# Patient Record
Sex: FEMALE | Race: WHITE | NOT HISPANIC OR LATINO | Employment: FULL TIME | URBAN - METROPOLITAN AREA
[De-identification: names, ages, dates, MRNs, and addresses within clinical notes are randomized per-mention and may not be internally consistent; named-entity substitution may affect disease eponyms.]

---

## 2021-05-25 ENCOUNTER — RECORDS - HEALTHEAST (OUTPATIENT)
Dept: ADMINISTRATIVE | Facility: CLINIC | Age: 46
End: 2021-05-25

## 2021-05-28 ENCOUNTER — RECORDS - HEALTHEAST (OUTPATIENT)
Dept: ADMINISTRATIVE | Facility: CLINIC | Age: 46
End: 2021-05-28

## 2021-05-29 ENCOUNTER — RECORDS - HEALTHEAST (OUTPATIENT)
Dept: ADMINISTRATIVE | Facility: CLINIC | Age: 46
End: 2021-05-29

## 2022-12-19 ENCOUNTER — OFFICE VISIT (OUTPATIENT)
Dept: FAMILY MEDICINE | Facility: CLINIC | Age: 47
End: 2022-12-19
Payer: COMMERCIAL

## 2022-12-19 VITALS
OXYGEN SATURATION: 98 % | HEIGHT: 64 IN | HEART RATE: 70 BPM | BODY MASS INDEX: 31.58 KG/M2 | TEMPERATURE: 99.1 F | SYSTOLIC BLOOD PRESSURE: 156 MMHG | DIASTOLIC BLOOD PRESSURE: 96 MMHG | WEIGHT: 185 LBS

## 2022-12-19 DIAGNOSIS — Z12.4 CERVICAL CANCER SCREENING: ICD-10-CM

## 2022-12-19 DIAGNOSIS — Z12.11 SCREEN FOR COLON CANCER: ICD-10-CM

## 2022-12-19 DIAGNOSIS — H93.13 TINNITUS, BILATERAL: ICD-10-CM

## 2022-12-19 DIAGNOSIS — Z12.31 VISIT FOR SCREENING MAMMOGRAM: ICD-10-CM

## 2022-12-19 DIAGNOSIS — Z00.00 ENCOUNTER FOR PREVENTIVE CARE: Primary | ICD-10-CM

## 2022-12-19 DIAGNOSIS — J45.990 EXERCISE INDUCED BRONCHOSPASM: ICD-10-CM

## 2022-12-19 PROBLEM — R87.610 ASCUS OF CERVIX WITH NEGATIVE HIGH RISK HPV: Status: ACTIVE | Noted: 2020-01-20

## 2022-12-19 PROCEDURE — 90471 IMMUNIZATION ADMIN: CPT | Performed by: FAMILY MEDICINE

## 2022-12-19 PROCEDURE — G0145 SCR C/V CYTO,THINLAYER,RESCR: HCPCS | Performed by: FAMILY MEDICINE

## 2022-12-19 PROCEDURE — 87624 HPV HI-RISK TYP POOLED RSLT: CPT | Performed by: FAMILY MEDICINE

## 2022-12-19 PROCEDURE — 90715 TDAP VACCINE 7 YRS/> IM: CPT | Performed by: FAMILY MEDICINE

## 2022-12-19 PROCEDURE — 99386 PREV VISIT NEW AGE 40-64: CPT | Mod: 25 | Performed by: FAMILY MEDICINE

## 2022-12-19 RX ORDER — ALBUTEROL SULFATE 90 UG/1
1 AEROSOL, METERED RESPIRATORY (INHALATION) PRN
Qty: 18 G | Refills: 1 | Status: SHIPPED | OUTPATIENT
Start: 2022-12-19 | End: 2023-03-29

## 2022-12-19 RX ORDER — ALBUTEROL SULFATE 90 UG/1
1 AEROSOL, METERED RESPIRATORY (INHALATION) PRN
COMMUNITY
End: 2022-12-19

## 2022-12-19 ASSESSMENT — ENCOUNTER SYMPTOMS
DYSURIA: 0
CHILLS: 0
FREQUENCY: 0
HEMATURIA: 0
HEADACHES: 0
JOINT SWELLING: 0
COUGH: 0
SHORTNESS OF BREATH: 0
NAUSEA: 0
HEARTBURN: 0
WEAKNESS: 0
BREAST MASS: 0
PARESTHESIAS: 0
ABDOMINAL PAIN: 0
CONSTIPATION: 0
SORE THROAT: 0
NERVOUS/ANXIOUS: 0
DIARRHEA: 0
FEVER: 0
PALPITATIONS: 0
HEMATOCHEZIA: 0
MYALGIAS: 0
EYE PAIN: 0
DIZZINESS: 0
ARTHRALGIAS: 0

## 2022-12-19 ASSESSMENT — PAIN SCALES - GENERAL: PAINLEVEL: NO PAIN (0)

## 2022-12-19 NOTE — PROGRESS NOTES
SUBJECTIVE:   CC: Carey is an 47 year old who presents for preventive health visit.     Patient has been advised of split billing requirements and indicates understanding: Yes     Healthy Habits:     Getting at least 3 servings of Calcium per day:  Yes    Bi-annual eye exam:  Yes    Dental care twice a year:  Yes    Sleep apnea or symptoms of sleep apnea:  None    Diet:  Regular (no restrictions)    Frequency of exercise:  None    Taking medications regularly:  Yes    Medication side effects:  Not applicable    PHQ-2 Total Score: 0    Additional concerns today:  Yes    Breakthrough Bleeding  -Occurs intermittent, there when wiping and occurs with valsalva maneuver   -Denies any vaginal pain, however endorsing increased cramping  -Noticed for 9 months. Developed heaviness/cramping last several months.   -Patient still having regular periods.   -No pain with intercourse. Patient does have on and off bleeding with intercourse.   -Last period on December 5th  -No easy bleeding or bruising  -Denies history of abnormal pap smears. May have had an imperforate hymen, no abnormal pap smears.   -No history of uterine or ovarian cancer. No blood in stool or urine.     Today's PHQ-2 Score:   PHQ-2 ( 1999 [employer]) 12/19/2022   Q1: Little interest or pleasure in doing things 0   Q2: Feeling down, depressed or hopeless 0   PHQ-2 Score 0   Q1: Little interest or pleasure in doing things Not at all   Q2: Feeling down, depressed or hopeless Not at all   PHQ-2 Score 0       Social History     Tobacco Use     Smoking status: Never     Smokeless tobacco: Never   Substance Use Topics     Alcohol use: Yes     Alcohol/week: 7.0 standard drinks     Types: 7 Standard drinks or equivalent per week     If you drink alcohol do you typically have >3 drinks per day or >7 drinks per week? No    Alcohol Use 12/19/2022   Prescreen: >3 drinks/day or >7 drinks/week? No   Prescreen: >3 drinks/day or >7 drinks/week? -     Reviewed orders with patient.  " Reviewed health maintenance and updated orders accordingly - Yes  Lab work is in process  Labs reviewed in EPIC    Breast Cancer Screening:    Breast CA Risk Assessment (FHS-7) 12/19/2022   Do you have a family history of breast, colon, or ovarian cancer? No / Unknown       Mammogram Screening: Recommended annual mammography  Pertinent mammograms are reviewed under the imaging tab.    History of abnormal Pap smear: NO - age 30-65 PAP every 5 years with negative HPV co-testing recommended     Reviewed and updated as needed this visit by clinical staff   Tobacco  Allergies  Meds  Problems  Med Hx  Surg Hx  Fam Hx          Reviewed and updated as needed this visit by Provider   Tobacco  Allergies  Meds  Problems  Med Hx  Surg Hx  Fam Hx             Review of Systems   Constitutional: Negative for chills and fever.   HENT: Negative for congestion, ear pain, hearing loss and sore throat.    Eyes: Negative for pain and visual disturbance.   Respiratory: Negative for cough and shortness of breath.    Cardiovascular: Negative for chest pain, palpitations and peripheral edema.   Gastrointestinal: Negative for abdominal pain, constipation, diarrhea, heartburn, hematochezia and nausea.   Breasts:  Negative for tenderness, breast mass and discharge.   Genitourinary: Positive for pelvic pain and vaginal bleeding. Negative for dysuria, frequency, genital sores, hematuria, urgency and vaginal discharge.   Musculoskeletal: Negative for arthralgias, joint swelling and myalgias.   Skin: Negative for rash.   Neurological: Negative for dizziness, weakness, headaches and paresthesias.   Psychiatric/Behavioral: Negative for mood changes. The patient is not nervous/anxious.       OBJECTIVE:   BP (!) 156/96 (BP Location: Right arm, Patient Position: Right side, Cuff Size: Adult Regular)   Pulse 70   Temp 99.1  F (37.3  C) (Temporal)   Ht 1.626 m (5' 4\")   Wt 83.9 kg (185 lb)   LMP 12/05/2022   SpO2 98%   Breastfeeding " No   BMI 31.76 kg/m    Physical Exam  GENERAL: healthy, alert and no distress  EYES: Eyes grossly normal to inspection, PERRL and conjunctivae and sclerae normal  HENT: ear canals and TM's normal, nose and mouth without ulcers or lesions  NECK: no adenopathy, no asymmetry, masses, or scars and thyroid normal to palpation  RESP: lungs clear to auscultation - no rales, rhonchi or wheezes  CV: regular rate and rhythm, normal S1 S2, no S3 or S4, no murmur, click or rub, no peripheral edema and peripheral pulses strong  ABDOMEN: soft, nontender, no hepatosplenomegaly, no masses and bowel sounds normal  MS: no gross musculoskeletal defects noted, no edema  SKIN: no suspicious lesions or rashes  PSYCH: mentation appears normal, affect normal/bright  : Normal vaginal wall, possible polyp and small friable lesion.     Diagnostic Test Results:  Labs reviewed in Epic  Results for orders placed or performed in visit on 12/19/22   Pap Screen with HPV - recommended age 30 - 65 years     Status: None   Result Value Ref Range    Interpretation        Negative for Intraepithelial Lesion or Malignancy (NILM)    Comment         Papanicolaou Test Limitations:  Cervical cytology is a screening test with limited sensitivity, and regular screening is critical for cancer prevention.  Pap tests are primarily effective for the diagnosis/prevention of squamous cell carcinoma, not adenocarcinoma or other cancers.        Specimen Adequacy       Satisfactory for evaluation, endocervical/transformation zone component present    Clinical Information       irregular bleeding      Reflex Testing Yes regardless of result     Previous Abnormal?       Yes[ASCUS positive HPV negative      Previous Abnormal Diagnosis       No colp      Performing Labs       The technical component of this testing was completed at [facility]         ASSESSMENT/PLAN:   Carey was seen today for  physical.    Diagnoses and all orders for this visit:    Encounter for preventive care  Patient with healthy habits, discussed BP as elevated, recommending 30 day follow up.     Visit for screening mammogram  -     MA SCREENING DIGITAL BILAT - Future  (s+30); Future    Screen for colon cancer  -     COLOGUARD(EXACT SCIENCES); Future    Cervical cancer screening  Hx of ASCUS  -     Pap Screen with HPV - recommended age 30 - 65 years  -     HPV Hold (Lab Only)  -     HPV High Risk Types DNA Cervical    Exercise induced bronchospasm  Chronic, stable.   -     albuterol (PROAIR HFA/PROVENTIL HFA/VENTOLIN HFA) 108 (90 Base) MCG/ACT inhaler; Inhale 1 puff into the lungs as needed for shortness of breath, wheezing or cough    Tinnitus, bilateral  Bilateral tinnitus with some difficulty hearing. New but chronic. Will refer for evaluation.   -     Adult Audiology  Referral; Future    Other orders  -     REVIEW OF HEALTH MAINTENANCE PROTOCOL ORDERS  -     TDAP VACCINE (Adacel, Boostrix)        Patient has been advised of split billing requirements and indicates understanding: Yes      COUNSELING:  Reviewed preventive health counseling, as reflected in patient instructions        She reports that she has never smoked. She has never used smokeless tobacco.      Catarina Serrano, [facility]

## 2022-12-21 LAB
BKR LAB AP GYN ADEQUACY: NORMAL
BKR LAB AP GYN INTERPRETATION: NORMAL
BKR LAB AP HPV REFLEX: NORMAL
BKR LAB AP PREVIOUS ABNL DX: NORMAL
BKR LAB AP PREVIOUS ABNORMAL: NORMAL
PATH REPORT.COMMENTS IMP SPEC: NORMAL
PATH REPORT.COMMENTS IMP SPEC: NORMAL
PATH REPORT.RELEVANT HX SPEC: NORMAL

## 2022-12-23 ENCOUNTER — TELEPHONE (OUTPATIENT)
Dept: FAMILY MEDICINE | Facility: CLINIC | Age: 47
End: 2022-12-23

## 2022-12-23 DIAGNOSIS — J45.990 EXERCISE INDUCED BRONCHOSPASM: Primary | ICD-10-CM

## 2022-12-23 LAB
HUMAN PAPILLOMA VIRUS 16 DNA: NEGATIVE
HUMAN PAPILLOMA VIRUS 18 DNA: POSITIVE
HUMAN PAPILLOMA VIRUS FINAL DIAGNOSIS: ABNORMAL
HUMAN PAPILLOMA VIRUS OTHER HR: NEGATIVE

## 2022-12-23 RX ORDER — ALBUTEROL SULFATE 90 UG/1
2 AEROSOL, METERED RESPIRATORY (INHALATION) EVERY 6 HOURS PRN
Qty: 18 G | Refills: 1 | Status: SHIPPED | OUTPATIENT
Start: 2022-12-23 | End: 2023-02-22

## 2022-12-23 NOTE — TELEPHONE ENCOUNTER
"Received fax from pharmacy requesting clarification for Albuterol.    \"Please clarify the FREQUENCY of administration for the prescription ALBUTEROL HFA (every day, BID, etc) Please include updated quantity for 90 days supply, if necessary. Thank you\"  "

## 2022-12-26 ENCOUNTER — PATIENT OUTREACH (OUTPATIENT)
Dept: FAMILY MEDICINE | Facility: CLINIC | Age: 47
End: 2022-12-26

## 2023-01-16 ENCOUNTER — ANCILLARY PROCEDURE (OUTPATIENT)
Dept: MAMMOGRAPHY | Facility: HOSPITAL | Age: 48
End: 2023-01-16
Attending: FAMILY MEDICINE
Payer: COMMERCIAL

## 2023-01-16 DIAGNOSIS — Z12.31 VISIT FOR SCREENING MAMMOGRAM: ICD-10-CM

## 2023-01-16 PROCEDURE — 77067 SCR MAMMO BI INCL CAD: CPT

## 2023-01-19 LAB — NONINV COLON CA DNA+OCC BLD SCRN STL QL: NEGATIVE

## 2023-01-25 ENCOUNTER — ALLIED HEALTH/NURSE VISIT (OUTPATIENT)
Dept: FAMILY MEDICINE | Facility: CLINIC | Age: 48
End: 2023-01-25
Payer: COMMERCIAL

## 2023-01-25 VITALS — DIASTOLIC BLOOD PRESSURE: 96 MMHG | SYSTOLIC BLOOD PRESSURE: 150 MMHG

## 2023-01-25 DIAGNOSIS — Z01.30 BP CHECK: Primary | ICD-10-CM

## 2023-01-25 PROCEDURE — 99207 PR NO CHARGE NURSE ONLY: CPT

## 2023-01-25 NOTE — PROGRESS NOTES
I met with Carey Dawson at the request of  to recheck her blood pressure.  Blood pressure medications on the med list were reviewed with patient.    Patient has taken all medications as per usual regimen: Yes  Patient reports tolerating them without any issues or concerns: Yes    Vitals:    01/25/23 1522 01/25/23 1527   BP: (!) 173/91 (!) 150/96   BP Location: Right arm Right arm   Patient Position: Sitting Sitting   Cuff Size: Adult Regular Adult Regular       After 5 minutes, the patient's blood pressure remained greater than or equal to 140/90.    Is the patient currently having any chest pain? No  Does the patient currently have a headache? No  Does the patient currently have any vision changes? No  Does the patient currently have any nausea? No  Does the patient currently have any abdominal pain? No    The previous encounter was reviewed.  The patient was discharged and the note will be sent to the provider for final review.

## 2023-02-04 ENCOUNTER — HEALTH MAINTENANCE LETTER (OUTPATIENT)
Age: 48
End: 2023-02-04

## 2023-02-16 ENCOUNTER — TELEPHONE (OUTPATIENT)
Dept: INTERNAL MEDICINE | Facility: CLINIC | Age: 48
End: 2023-02-16
Payer: COMMERCIAL

## 2023-02-16 DIAGNOSIS — Z01.812 PRE-PROCEDURAL LABORATORY EXAMINATION: Primary | ICD-10-CM

## 2023-02-21 DIAGNOSIS — J45.990 EXERCISE INDUCED BRONCHOSPASM: ICD-10-CM

## 2023-02-22 RX ORDER — ALBUTEROL SULFATE 90 UG/1
2 AEROSOL, METERED RESPIRATORY (INHALATION) EVERY 6 HOURS PRN
Qty: 18 G | Refills: 1 | Status: SHIPPED | OUTPATIENT
Start: 2023-02-22

## 2023-02-22 NOTE — TELEPHONE ENCOUNTER
"Last Written Prescription Date:  12/23/22  Last Fill Quantity: 18,  # refills: 1   Last office visit provider:  12/19/22     Requested Prescriptions   Pending Prescriptions Disp Refills     albuterol (PROAIR HFA/PROVENTIL HFA/VENTOLIN HFA) 108 (90 Base) MCG/ACT inhaler 18 g 1     Sig: Inhale 2 puffs into the lungs every 6 hours as needed for shortness of breath, wheezing or cough       Asthma Maintenance Inhalers - Anticholinergics Passed - 2/21/2023  3:04 PM        Passed - Patient is age 12 years or older        Passed - Recent (12 mo) or future (30 days) visit within the authorizing provider's specialty     Patient has had an office visit with the authorizing provider or a provider within the authorizing providers department within the previous 12 mos or has a future within next 30 days. See \"Patient Info\" tab in inbasket, or \"Choose Columns\" in Meds & Orders section of the refill encounter.              Passed - Medication is active on med list       Short-Acting Beta Agonist Inhalers Protocol  Passed - 2/21/2023  3:04 PM        Passed - Patient is age 12 or older        Passed - Recent (12 mo) or future (30 days) visit within the authorizing provider's specialty     Patient has had an office visit with the authorizing provider or a provider within the authorizing providers department within the previous 12 mos or has a future within next 30 days. See \"Patient Info\" tab in inbasket, or \"Choose Columns\" in Meds & Orders section of the refill encounter.              Passed - Medication is active on med list             Salome Roldan RN 02/22/23 4:55 PM  "

## 2023-03-29 ENCOUNTER — OFFICE VISIT (OUTPATIENT)
Dept: INTERNAL MEDICINE | Facility: CLINIC | Age: 48
End: 2023-03-29
Payer: COMMERCIAL

## 2023-03-29 VITALS
WEIGHT: 189.8 LBS | TEMPERATURE: 98.8 F | RESPIRATION RATE: 12 BRPM | DIASTOLIC BLOOD PRESSURE: 86 MMHG | HEIGHT: 64 IN | BODY MASS INDEX: 32.4 KG/M2 | HEART RATE: 68 BPM | OXYGEN SATURATION: 98 % | SYSTOLIC BLOOD PRESSURE: 142 MMHG

## 2023-03-29 DIAGNOSIS — Z01.812 PRE-PROCEDURAL LABORATORY EXAMINATION: ICD-10-CM

## 2023-03-29 DIAGNOSIS — R87.810 CERVICAL HIGH RISK HPV (HUMAN PAPILLOMAVIRUS) TEST POSITIVE: Primary | ICD-10-CM

## 2023-03-29 LAB — HCG UR QL: NEGATIVE

## 2023-03-29 PROCEDURE — 57452 EXAM OF CERVIX W/SCOPE: CPT | Performed by: NURSE PRACTITIONER

## 2023-03-29 PROCEDURE — 81025 URINE PREGNANCY TEST: CPT | Performed by: NURSE PRACTITIONER

## 2023-03-29 ASSESSMENT — PAIN SCALES - GENERAL: PAINLEVEL: NO PAIN (0)

## 2023-03-29 NOTE — PROGRESS NOTES
"Colposcopy Procedure Note    Indications: Recent pap smear showed NILM, + HR HPV type 18.  Pertinent past history includes:  2008, 2010, 2012, 2016 NIL pap  1/20/20 ASCUS pap, Neg HPV    No history of nicotine use. She had a change in sexual partners around 10 years ago after divorce from her first  of 17 years but she is in a monogamous relationship.     Procedure Details   BP (!) 142/86 (BP Location: Left arm, Patient Position: Sitting, Cuff Size: Adult Regular)   Pulse 68   Temp 98.8  F (37.1  C) (Oral)   Resp 12   Ht 1.626 m (5' 4\")   Wt 86.1 kg (189 lb 12.8 oz)   LMP 03/19/2023 (Exact Date)   SpO2 98%   BMI 32.58 kg/m      Body mass index is 32.58 kg/m .    The reason for procedure is explained, and verbal informed consent is obtained.    Speculum is placed in the vagina and visualization of cervix is achieved.   Visualization of the cervix: fully visible  Visualization of the squamocolumnar junction: fully visible    The cervix is swabbed with 3% acetic acid solution.   There are no acetowhite changes after application of dilute acetic acid. Green light filter is applied, no abnormal vessels seen.     Impression: Normal/benign    Plan:  The role of HPV in causing cervical pap smear abnormalities, dysplasia, and cancer is reviewed with the patient. She is advised to follow up with her PCP in 1 year for repeat co-testing.   "

## 2023-03-29 NOTE — PROGRESS NOTES
"  {PROVIDER CHARTING PREFERENCE:172048}    Subjective   Carey is a 48 year old, presenting for the following health issues:  Colposcopy    Additional Questions 3/29/2023   Roomed by Cat CH MA   Accompanied by N/A     HPI     {SUPERLIST (Optional):570813}  {additonal problems for provider to add (Optional):382334}      Review of Systems   {ROS COMP (Optional):250819}      Objective    BP (!) 142/86 (BP Location: Left arm, Patient Position: Sitting, Cuff Size: Adult Regular)   Pulse 68   Temp 98.8  F (37.1  C) (Oral)   Resp 12   Ht 1.626 m (5' 4\")   Wt 86.1 kg (189 lb 12.8 oz)   LMP 03/19/2023 (Exact Date)   SpO2 98%   BMI 32.58 kg/m    Body mass index is 32.58 kg/m .  Physical Exam   {Exam List (Optional):142407}    {Diagnostic Test Results (Optional):053989}    {AMBULATORY ATTESTATION (Optional):323950}            "

## 2023-04-24 ENCOUNTER — PATIENT OUTREACH (OUTPATIENT)
Dept: FAMILY MEDICINE | Facility: CLINIC | Age: 48
End: 2023-04-24
Payer: COMMERCIAL

## 2023-07-06 ENCOUNTER — OFFICE VISIT (OUTPATIENT)
Dept: FAMILY MEDICINE | Facility: CLINIC | Age: 48
End: 2023-07-06
Payer: COMMERCIAL

## 2023-07-06 ENCOUNTER — HOSPITAL ENCOUNTER (EMERGENCY)
Facility: HOSPITAL | Age: 48
Discharge: HOME | End: 2023-07-06
Attending: STUDENT IN AN ORGANIZED HEALTH CARE EDUCATION/TRAINING PROGRAM | Admitting: STUDENT IN AN ORGANIZED HEALTH CARE EDUCATION/TRAINING PROGRAM
Payer: COMMERCIAL

## 2023-07-06 VITALS
HEART RATE: 70 BPM | RESPIRATION RATE: 16 BRPM | TEMPERATURE: 98.7 F | SYSTOLIC BLOOD PRESSURE: 178 MMHG | BODY MASS INDEX: 31.03 KG/M2 | OXYGEN SATURATION: 99 % | DIASTOLIC BLOOD PRESSURE: 88 MMHG | WEIGHT: 180.78 LBS

## 2023-07-06 VITALS
TEMPERATURE: 98.6 F | OXYGEN SATURATION: 100 % | DIASTOLIC BLOOD PRESSURE: 95 MMHG | HEART RATE: 63 BPM | RESPIRATION RATE: 14 BRPM | WEIGHT: 182 LBS | SYSTOLIC BLOOD PRESSURE: 155 MMHG | BODY MASS INDEX: 31.24 KG/M2

## 2023-07-06 DIAGNOSIS — R51.9 TEMPORAL HEADACHE: ICD-10-CM

## 2023-07-06 DIAGNOSIS — R42 DIZZINESS: ICD-10-CM

## 2023-07-06 DIAGNOSIS — H53.2 DOUBLE VISION: ICD-10-CM

## 2023-07-06 DIAGNOSIS — R42 LIGHTHEADEDNESS: Primary | ICD-10-CM

## 2023-07-06 LAB
ALBUMIN SERPL BCG-MCNC: 4.5 G/DL (ref 3.5–5.2)
ALP SERPL-CCNC: 84 U/L (ref 35–104)
ALT SERPL W P-5'-P-CCNC: 32 U/L (ref 0–50)
ANION GAP SERPL CALCULATED.3IONS-SCNC: 13 MMOL/L (ref 7–15)
AST SERPL W P-5'-P-CCNC: 27 U/L (ref 0–45)
BASOPHILS # BLD AUTO: 0 10E3/UL (ref 0–0.2)
BASOPHILS # BLD AUTO: 0.1 10E3/UL (ref 0–0.2)
BASOPHILS NFR BLD AUTO: 1 %
BASOPHILS NFR BLD AUTO: 1 %
BILIRUB SERPL-MCNC: 0.2 MG/DL
BUN SERPL-MCNC: 9.7 MG/DL (ref 6–20)
CALCIUM SERPL-MCNC: 9.8 MG/DL (ref 8.6–10)
CHLORIDE SERPL-SCNC: 105 MMOL/L (ref 98–107)
CREAT SERPL-MCNC: 0.8 MG/DL (ref 0.51–0.95)
CRP SERPL-MCNC: 3.1 MG/L
DEPRECATED HCO3 PLAS-SCNC: 22 MMOL/L (ref 22–29)
EOSINOPHIL # BLD AUTO: 0.2 10E3/UL (ref 0–0.7)
EOSINOPHIL # BLD AUTO: 0.2 10E3/UL (ref 0–0.7)
EOSINOPHIL NFR BLD AUTO: 2 %
EOSINOPHIL NFR BLD AUTO: 3 %
ERYTHROCYTE [DISTWIDTH] IN BLOOD BY AUTOMATED COUNT: 13.9 % (ref 10–15)
ERYTHROCYTE [DISTWIDTH] IN BLOOD BY AUTOMATED COUNT: 14.2 % (ref 10–15)
ERYTHROCYTE [SEDIMENTATION RATE] IN BLOOD BY WESTERGREN METHOD: 10 MM/HR (ref 0–30)
GFR SERPL CREATININE-BSD FRML MDRD: 89 ML/MIN/1.73M2
GLUCOSE BLD-MCNC: 90 MG/DL (ref 60–99)
GLUCOSE SERPL-MCNC: 95 MG/DL (ref 70–99)
HCT VFR BLD AUTO: 39.4 % (ref 35–47)
HCT VFR BLD AUTO: 43.3 % (ref 35–47)
HGB BLD-MCNC: 13.1 G/DL (ref 11.7–15.7)
HGB BLD-MCNC: 14 G/DL (ref 11.7–15.7)
IMM GRANULOCYTES # BLD: 0 10E3/UL
IMM GRANULOCYTES # BLD: 0 10E3/UL
IMM GRANULOCYTES NFR BLD: 0 %
IMM GRANULOCYTES NFR BLD: 0 %
LYMPHOCYTES # BLD AUTO: 1.8 10E3/UL (ref 0.8–5.3)
LYMPHOCYTES # BLD AUTO: 2 10E3/UL (ref 0.8–5.3)
LYMPHOCYTES NFR BLD AUTO: 28 %
LYMPHOCYTES NFR BLD AUTO: 29 %
MCH RBC QN AUTO: 27.6 PG (ref 26.5–33)
MCH RBC QN AUTO: 28.5 PG (ref 26.5–33)
MCHC RBC AUTO-ENTMCNC: 32.3 G/DL (ref 31.5–36.5)
MCHC RBC AUTO-ENTMCNC: 33.2 G/DL (ref 31.5–36.5)
MCV RBC AUTO: 85 FL (ref 78–100)
MCV RBC AUTO: 86 FL (ref 78–100)
MONOCYTES # BLD AUTO: 0.4 10E3/UL (ref 0–1.3)
MONOCYTES # BLD AUTO: 0.5 10E3/UL (ref 0–1.3)
MONOCYTES NFR BLD AUTO: 6 %
MONOCYTES NFR BLD AUTO: 8 %
NEUTROPHILS # BLD AUTO: 3.9 10E3/UL (ref 1.6–8.3)
NEUTROPHILS # BLD AUTO: 4.3 10E3/UL (ref 1.6–8.3)
NEUTROPHILS NFR BLD AUTO: 61 %
NEUTROPHILS NFR BLD AUTO: 62 %
NRBC # BLD AUTO: 0 10E3/UL
NRBC BLD AUTO-RTO: 0 /100
PLATELET # BLD AUTO: 243 10E3/UL (ref 150–450)
PLATELET # BLD AUTO: 258 10E3/UL (ref 150–450)
POTASSIUM SERPL-SCNC: 3.8 MMOL/L (ref 3.4–5.3)
PROT SERPL-MCNC: 7.3 G/DL (ref 6.4–8.3)
RBC # BLD AUTO: 4.6 10E6/UL (ref 3.8–5.2)
RBC # BLD AUTO: 5.08 10E6/UL (ref 3.8–5.2)
SODIUM SERPL-SCNC: 140 MMOL/L (ref 136–145)
WBC # BLD AUTO: 6.4 10E3/UL (ref 4–11)
WBC # BLD AUTO: 7 10E3/UL (ref 4–11)

## 2023-07-06 PROCEDURE — 80053 COMPREHEN METABOLIC PANEL: CPT | Performed by: NURSE PRACTITIONER

## 2023-07-06 PROCEDURE — 99215 OFFICE O/P EST HI 40 MIN: CPT | Performed by: NURSE PRACTITIONER

## 2023-07-06 PROCEDURE — 36415 COLL VENOUS BLD VENIPUNCTURE: CPT | Performed by: NURSE PRACTITIONER

## 2023-07-06 PROCEDURE — 85025 COMPLETE CBC W/AUTO DIFF WBC: CPT | Performed by: NURSE PRACTITIONER

## 2023-07-06 PROCEDURE — 99283 EMERGENCY DEPT VISIT LOW MDM: CPT

## 2023-07-06 PROCEDURE — 86140 C-REACTIVE PROTEIN: CPT | Performed by: NURSE PRACTITIONER

## 2023-07-06 PROCEDURE — 36415 COLL VENOUS BLD VENIPUNCTURE: CPT | Performed by: STUDENT IN AN ORGANIZED HEALTH CARE EDUCATION/TRAINING PROGRAM

## 2023-07-06 PROCEDURE — 85025 COMPLETE CBC W/AUTO DIFF WBC: CPT | Performed by: STUDENT IN AN ORGANIZED HEALTH CARE EDUCATION/TRAINING PROGRAM

## 2023-07-06 PROCEDURE — 80053 COMPREHEN METABOLIC PANEL: CPT | Performed by: STUDENT IN AN ORGANIZED HEALTH CARE EDUCATION/TRAINING PROGRAM

## 2023-07-06 PROCEDURE — 85652 RBC SED RATE AUTOMATED: CPT | Performed by: NURSE PRACTITIONER

## 2023-07-06 ASSESSMENT — ENCOUNTER SYMPTOMS
VOMITING: 0
CONFUSION: 0
FEVER: 0
BACK PAIN: 0
COUGH: 0
CHILLS: 0
MYALGIAS: 0
NAUSEA: 1

## 2023-07-06 NOTE — DISCHARGE INSTRUCTIONS
Please call your primary care doctor tomorrow.  I would recommend that you confirm that my outpatient order is in appropriately.  I was able to place an outpatient order however sometimes these have hiccups going through from the emergency department.    Continue with any home medications to take.  Return for any worsening symptoms.

## 2023-07-06 NOTE — ED PROVIDER NOTES
Emergency Department Encounter         FINAL IMPRESSION:      Dizziness, blurry vision        ED COURSE AND MEDICAL DECISION MAKING       ED Course as of 07/06/23 1824   Thu Jul 06, 2023   1815 Patient is a 48-year-old female with no chronic medical problems, here with double vision has been intermittently happening as well as vertiginous symptoms for last 9 months.  Patient dates today she had a severe episode that lasted much longer and more severe than normal which initiated visit to an urgent care.  Urgent care she stated that her symptoms are slowly resolving however the urgent care provider sent her here for further imaging including MRI.  Patient states that the symptoms have been intermittent over last few months however worse in the last few weeks.  States this episode lasted 10 minutes today.  States she has double vision as well as difficulty walking.  No hearing issues.  No dysarthria, weakness or paresthesias.  Mild nausea with no vomiting.  No chest pain or trouble breathing.  States today she had some paresthesias with the episode however she was probably hyperventilating and quite nervous from that event.  When I saw patient in exam room, she was hypertensive although looked well and nontoxic.  Asymptomatic with no vertiginous symptoms, headache, vision changes, chest pain, trouble breathing, nausea, diaphoresis, paresthesias or weakness.  Her NIH is 0.  Recommended MRI however she would stated that she would rather follow-up as an outpatient.  I again told her that the gold standard would would most likely be an MRI today considering her symptoms are worse in the last 9 months however she declined.  We will attempt to place an outpatient order otherwise she will have to call her primary care doctor and follow-up as an outpatient.         Medical Decision Making    History:    Supplemental history from: N/A    External Record(s) reviewed: Documented in chart, if applicable.    Work Up:    Chart  documentation includes differential considered and any EKGs or imaging independently interpreted by provider, where specified.    In additional to work up documented, I considered the following work up: Documented in chart, if applicable.    External consultation:    Discussion of management with another provider: Documented in chart, if applicable    Complicating factors:    Care impacted by chronic illness: N/A    Care affected by social determinants of health: Access to Medical Care    Disposition considerations: Discharge. No recommendations on prescription strength medication(s). See documentation for any additional details.        EKG      I have independently reviewed and interpreted the EKG(s) documented above.       Critical Care     Performed by: Arie Baldwin or    Authorized by: Arie Baldwin  Total critical care time:  minutes  Critical care was necessary to treat or prevent imminent or life-threatening deterioration of the following conditions:   Critical care was time spent personally by me on the following activities: development of treatment plan with patient or surrogate, discussions with consultants, examination of patient, evaluation of patient's response to treatment, obtaining history from patient or surrogate, ordering and performing treatments and interventions, ordering and review of laboratory studies, ordering and review of radiographic studies, re-evaluation of patient's condition and monitoring for potential decompensation.  Critical care time was exclusive of separately billable procedures and treating other patients.'    At the conclusion of the encounter I discussed the results of all the tests and the disposition. The questions were answered. The patient or family acknowledged understanding and was agreeable with the care plan.      MEDICATIONS GIVEN IN THE EMERGENCY DEPARTMENT:  Medications - No data to display    NEW PRESCRIPTIONS STARTED AT TODAY'S ED VISIT:  New Prescriptions    No  medications on file       HPI     Patient information obtained from: Patient    Use of Interpretor: N/A    Carey Dawson is a 48 year old female with no chronic medical problems who presents to this ED by private car for evaluation of diplopia, headache.     Patient also reports some vertiginous symptoms for last 9 months. Patient dates today she had a severe episode that lasted much longer and more severe than normal which initiated visit to an urgent care.  At urgent care she stated that her symptoms are slowly resolving, however, the urgent care provider sent her here for further imaging including MRI. Patient states that the symptoms have been intermittent over last few months, however, worse in the last few weeks. She reports this episode lasted 10 minutes today as well some double vision with difficulty walking. In addition, at that time patient noted some generalized paraesthesias.      At present, also complains of mild nausea with no vomiting. No hearing issues, dysarthria, weakness, chest pain, shortness of breath, or paresthesias.      REVIEW OF SYSTEMS:  Review of Systems   Constitutional: Negative for fever, malaise  HEENT: Negative runny nose, sore throat, ear pain, neck pain  Eyes: Positive for vision changes (dipolpia).   Respiratory: Negative for shortness of breath, cough, congestion  Cardiovascular: Negative for chest pain, leg edema  Gastrointestinal: Negative for abdominal distention, abdominal pain, constipation, vomiting, nausea, diarrhea  Genitourinary: Negative for dysuria and hematuria.   Integument: Negative for rash, skin breakdown  Neurological: Positive for paresthesias (transient episode, resolved), headche, and dizziness. Negative for weakness  Musculoskeletal: Negative for joint pain, joint swelling    All other systems reviewed and are negative.    MEDICAL HISTORY     No past medical history on file.    No past surgical history on file.    Social History     Tobacco Use     Smoking  status: Never     Smokeless tobacco: Never   Substance Use Topics     Alcohol use: Yes     Alcohol/week: 7.0 standard drinks of alcohol     Types: 7 Standard drinks or equivalent per week     Drug use: Never       albuterol (PROAIR HFA/PROVENTIL HFA/VENTOLIN HFA) 108 (90 Base) MCG/ACT inhaler          PHYSICAL EXAM     BP (!) 178/88   Pulse 70   Temp 98.7  F (37.1  C) (Temporal)   Resp 16   Wt 82 kg (180 lb 12.4 oz)   LMP 06/29/2023 (Exact Date)   SpO2 99%   BMI 31.03 kg/m        PHYSICAL EXAM:     General: Patient appears well, nontoxic, comfortable  HEENT: Moist mucous membranes,  No head trauma.  No nystagmus.  TMs clear  Cardiovascular: Normal rate, normal rhythm, no extremity edema.  No appreciable murmur.  Respiratory: No signs of respiratory distress, lungs are clear to auscultation bilaterally with no wheezes rhonchi or rales.  Abdominal: Soft, nontender, nondistended, no palpable masses, no guarding, no rebound  Musculoskeletal: Full range of motion of joints, no deformities appreciated.  Neurological: Alert and oriented, +5 strength UE/LE, normal finger to nose, , gross sensation intact throughout, CN II-12 intact grossly, no difficulty with ambulation, no slurring of words, no word finding difficulty    Psychological: Normal affect and mood.  Integument: No rashes appreciated    National Institutes of Health Stroke Scale  Exam Interval: Worsening last 9 months   Score    Level of consciousness: (0)   Alert, keenly responsive    LOC questions: (0)   Answers both questions correctly    LOC commands: (0)   Performs both tasks correctly    Best gaze: (0)   Normal    Visual: (0)   No visual loss    Facial palsy: (0)   Normal symmetrical movements    Motor arm (left): (0)   No drift    Motor arm (right): (0)   No drift    Motor leg (left): (0)   No drift    Motor leg (right): (0)   No drift    Limb ataxia: (0)   Absent    Sensory: (0)   Normal- no sensory loss    Best language: (0)   Normal- no aphasia     Dysarthria: (0)   Normal    Extinction and inattention: (0)   No abnormality        Total Score:  0       RESULTS       Labs Ordered and Resulted from Time of ED Arrival to Time of ED Departure - No data to display    MR Brain w/o & w Contrast    (Results Pending)   MRA Brain ([place] [facility] [place]) w Contrast    (Results Pending)   MRA Neck ([place]) w Contrast    (Results Pending)             PROCEDURES:  Procedures:  Procedures       I, Emiliano Bautista am serving as a scribe to document services personally performed by Arie Baldwin DO, based on my observations and the provider's statements to me.  I, Arie Baldwin DO, attest that Emiliano Bautista is acting in a scribe capacity, has observed my performance of the services and has documented them in accordance with my direction.    Arie Baldwin DO  Emergency Medicine  [facility] EMERGENCY DEPARTMENT     Arie Baldwin DO  07/07/23 0034

## 2023-07-06 NOTE — ED TRIAGE NOTES
She has had double vision for the last nine months. Today she had one four days ago and one today. She was seen at the clinic today and sent here for an MRI. She currently has a headache.

## 2023-07-06 NOTE — ED NOTES
"Expected Patient Referral to ED  4:44 PM    Referring Clinic/Provider:  Urgent care walk in clinic    Reason for referral/Clinical facts:  Walk in clinic, double vision \"only in the left eye when it's there\", ongoing many months, comprehensive eye examination in April normal, had an episode today 5 minutes that resolved    Recommendations provided:  Send to ED for further evaluation    Caller was informed that this institution does possess the capabilities and/or resources to provide for patient and should be transferred to our facility.    Discussed that if direct admit is sought and any hurdles are encountered, this ED would be happy to see the patient and evaluate.    Informed caller that recommendations provided are recommendations based only on the facts provided and that they responsible to accept or reject the advice, or to seek a formal in person consultation as needed and that this ED will see/treat patient should they arrive.      Marysol Li MD  [facility] EMERGENCY DEPARTMENT  [address] [place] [id number]  [phone]       Marysol Li MD  07/06/23 2495    "

## 2023-07-06 NOTE — PROGRESS NOTES
Assessment & Plan     Lightheadedness    - Glucose, whole blood  - CBC with Platelets & Differential  - Glucose, whole blood  - CBC with Platelets & Differential    Temporal headache    - ESR: Erythrocyte sedimentation rate  - CRP, inflammation  - ESR: Erythrocyte sedimentation rate  - CRP, inflammation    Double vision       Patient with intermittent episodes of double vision lasting 5 to 10 minutes, last episode today, associated currently with abnormal sensation in her fingertips, some difficulty controlling her hands earlier, off-balance feeling while walking with lightheadedness.  No room spinning dizziness.  Does have a new headache with this.  No history of migraines nor other headache types.  No illness symptoms like fever.  He healthy and does not take any medications.     Noted to have tenderness over the left temple only.      Considered conditions such as BPPV, anemia, hypoglycemia, and more rare issue such as giant cell arteritis, stroke, MS, mass or Parkinson's.  New onset migraines would be less likely at this age.     She does also have dental pain started around the same time, but should not cause the symptoms.    Quick screening for obvious conditions like anemia and hypoglycemia - nml     Not currently have a vision issue.  Visual acuity - nml    Recommend further evaluation in the emergency room due to age, new headache type with associated symptoms of double vision associated with new headache and off-balance feeling.  She was stumbling a little bit in the room.  Discussed patient with Dr. Richards at [facility] ED.    After a rest period - Patient was rechecked and her hand sensations were better and her head pressure was still there at about 3 out of 10.               No follow-ups on file.    Jade Wilkinson [facility]    Omi Paitno is a 48 year old female who presents to clinic today for the following health issues:  Chief Complaint   Patient presents  "with     Dizziness     Double vision has been going on for months. Dizziness occurs when vision is blurry. Feels inside of body is \"tingling.\" Nausea, headaches.      HPI    Feeling shaky, tingling in both hands since 1330.     Started same time as lightheadedness.  No room spinning dizziness.    Vision is 'double' off-and-on.  Feels like it's only in the left eye.  Lasts 5-10 minutes at a time when it happens.  Lasted 5-7 minutes.      Had appt with eye MD in April.  Says she had a comprehensive eye exam which was normal.  Double vision episodes had been happening 1x per month.  Was told maybe eye strain.      Has a headache 'pressure.'     Currently has global head pressure and a weird sensation in fingertips.  A little nauseated with walking, better with sitting.      No h/o migraines.  Doesn't get headaches usually.  Headache associated with this is new.    Mild difficulty with balance.  Feels like she cannot control the legs normally.    Doesn't take medications.      Currently is not having any vision issues.        Review of Systems   Constitutional: Negative for chills and fever.   HENT: Positive for dental problem (Dental pain left upper tooth). Negative for congestion.    Respiratory: Negative for cough.    Gastrointestinal: Positive for nausea. Negative for vomiting.   Musculoskeletal: Positive for gait problem. Negative for back pain and myalgias.        May be a little hip pain, but no trouble going from sitting to standing.  Able to run still.   Psychiatric/Behavioral: Negative for confusion.           Objective    BP (!) 155/95 (BP Location: Right arm, Patient Position: Sitting, Cuff Size: Adult Regular)   Pulse 63   Temp 98.6  F (37  C) (Oral)   Resp 14   Wt 82.6 kg (182 lb)   LMP 06/29/2023 (Exact Date)   SpO2 100%   BMI 31.24 kg/m    Physical Exam  Constitutional:       General: She is not in acute distress.     Appearance: She is well-developed.   Eyes:      General:         Right eye: No " discharge.         Left eye: No discharge.      Conjunctiva/sclera: Conjunctivae normal.      Pupils: Pupils are equal, round, and reactive to light.   Pulmonary:      Effort: Pulmonary effort is normal.   Musculoskeletal:         General: Tenderness (Tender over left temple, but not right.  Not tender over other areas of the head on the left side to tapping.) present. Normal range of motion.   Skin:     General: Skin is warm and dry.      Capillary Refill: Capillary refill takes less than 2 seconds.   Neurological:      Mental Status: She is alert and oriented to person, place, and time.      Gait: Gait abnormal (Had to hold onto a chair while walking across the room; otherwise appears normal).      Comments: No visual field loss.   Psychiatric:         Mood and Affect: Mood normal.         Behavior: Behavior normal.         Thought Content: Thought content normal.         Judgment: Judgment normal.                    VISION   Wears contact lenses: worn for testing  Tool used: Manriquez   Right eye:        10/12.5 (20/25)  Left eye:          10/12.5 (20/25)  Both yes:          20/20  Eloisa Weber / Certified Medical Assistant......7/6/2023 4:32 PM

## 2023-07-14 ENCOUNTER — HOSPITAL ENCOUNTER (OUTPATIENT)
Dept: MRI IMAGING | Facility: HOSPITAL | Age: 48
Discharge: HOME | End: 2023-07-14
Attending: STUDENT IN AN ORGANIZED HEALTH CARE EDUCATION/TRAINING PROGRAM
Payer: COMMERCIAL

## 2023-07-14 DIAGNOSIS — R42 DIZZINESS: ICD-10-CM

## 2023-07-14 PROCEDURE — A9585 GADOBUTROL INJECTION: HCPCS | Mod: JZ | Performed by: STUDENT IN AN ORGANIZED HEALTH CARE EDUCATION/TRAINING PROGRAM

## 2023-07-14 PROCEDURE — 70544 MR ANGIOGRAPHY HEAD W/O DYE: CPT | Mod: XU

## 2023-07-14 PROCEDURE — 70549 MR ANGIOGRAPH NECK W/O&W/DYE: CPT

## 2023-07-14 PROCEDURE — 255N000002 HC RX 255 OP 636: Mod: JZ | Performed by: STUDENT IN AN ORGANIZED HEALTH CARE EDUCATION/TRAINING PROGRAM

## 2023-07-14 PROCEDURE — 70553 MRI BRAIN STEM W/O & W/DYE: CPT

## 2023-07-14 RX ORDER — GADOBUTROL 604.72 MG/ML
0.1 INJECTION INTRAVENOUS ONCE
Status: COMPLETED | OUTPATIENT
Start: 2023-07-14 | End: 2023-07-14

## 2023-07-14 RX ADMIN — GADOBUTROL 8 ML: 604.72 INJECTION INTRAVENOUS at 19:28

## 2023-07-21 ENCOUNTER — NURSE TRIAGE (OUTPATIENT)
Dept: NURSING | Facility: CLINIC | Age: 48
End: 2023-07-21
Payer: COMMERCIAL

## 2023-07-21 NOTE — TELEPHONE ENCOUNTER
"7/6/ double vision Wed. Close one eye nauseated.  Contacts.  No change. Saw.  Eye strain.      Panic 9 months.  Looking at phone happened at work.    preassure in head.  When in ER. Severe HA.      Reason for Disposition    Blurred vision or visual changes and gradual onset (e.g., weeks, months)    Additional Information    Negative: Complete loss of vision in 1 or both eyes    Negative: SEVERE eye pain    Negative: Severe headache    Negative: Double vision    Negative: Blurred vision or visual changes and present now and sudden onset or new (e.g., minutes, hours, days)  (Exception: Seeing floaters / black specks OR previously diagnosed migraine headaches with same symptoms.)    Negative: Patient sounds very sick or weak to the triager    Negative: Flashes of light  (Exception: brief from pressing on the eyeball)    Negative: Eye pain and brief (now gone) blurred vision or visual changes    Negative: Taking digoxin (e.g., Lanoxin, Digitek, Cardoxin, Lanoxicaps; Toloxin in [place]) and blurred vision, yellow vision, or yellow-green halos    Negative: Many floaters in the eye    Negative: Jaw pain while eating and age > 50 years    Negative: Headache and age > 50 years    Negative: Patient wants to be seen    Negative: Single floater (i.e., small speck seems to float across the eye)  (Exception: Floater(s) are a chronic symptom and this is unchanged from patient's baseline pattern.)    Negative: Brief (now gone) blurred vision and unexplained    Protocols used: VISION LOSS OR CHANGE-A-OH  Nurse Triage SBAR    Is this a 2nd Level Triage? NO    Pt has had issues with double vision for about 9 months. She was seen on 7/6/23 after a \"bout of it.\"  Had a little \"panic attack when my coworker were telling me to go in.\"  That was on 7/6/23.  Had a MRI last week looked normal.  Labs were normal.   Last Wed. Close one eye nauseated. Where contacts.  No change. Was seen by Ophtalmogist within the last year.  Had told him about " this and he said it was eye strain.      It is pressure in her head.       Protocol Recommended Disposition:   See in Office Within 2 Weeks    Recommendation: F/U with her Ophthalmologist.       If severe eye pain or changes in vision call back.

## 2023-10-16 ENCOUNTER — MYC MEDICAL ADVICE (OUTPATIENT)
Dept: FAMILY MEDICINE | Facility: CLINIC | Age: 48
End: 2023-10-16
Payer: COMMERCIAL

## 2023-10-18 NOTE — TELEPHONE ENCOUNTER
Held approval req on 10/25/23 at 310pm for pt       Pancho Iglesias Jr., CMA on 10/18/2023 at 8:53 AM

## 2023-10-20 NOTE — TELEPHONE ENCOUNTER
Patient Responding to [employer] Message    Information relayed to patient:  Accepted appt for 10/25/23    Patient has additional questions:  No

## 2023-10-23 ASSESSMENT — ASTHMA QUESTIONNAIRES: ACT_TOTALSCORE: 25

## 2023-10-25 ENCOUNTER — OFFICE VISIT (OUTPATIENT)
Dept: FAMILY MEDICINE | Facility: CLINIC | Age: 48
End: 2023-10-25
Payer: COMMERCIAL

## 2023-10-25 VITALS
OXYGEN SATURATION: 99 % | TEMPERATURE: 98.3 F | DIASTOLIC BLOOD PRESSURE: 95 MMHG | WEIGHT: 182.1 LBS | RESPIRATION RATE: 20 BRPM | HEART RATE: 62 BPM | SYSTOLIC BLOOD PRESSURE: 135 MMHG | HEIGHT: 64 IN | BODY MASS INDEX: 31.09 KG/M2

## 2023-10-25 DIAGNOSIS — Z13.220 SCREENING FOR HYPERLIPIDEMIA: ICD-10-CM

## 2023-10-25 DIAGNOSIS — Z13.1 SCREENING FOR DIABETES MELLITUS: ICD-10-CM

## 2023-10-25 DIAGNOSIS — I26.94 MULTIPLE SUBSEGMENTAL PULMONARY EMBOLI WITHOUT ACUTE COR PULMONALE (H): Primary | ICD-10-CM

## 2023-10-25 PROBLEM — I26.99 PULMONARY EMBOLISM (H): Status: ACTIVE | Noted: 2023-10-14

## 2023-10-25 PROCEDURE — 99213 OFFICE O/P EST LOW 20 MIN: CPT | Performed by: FAMILY MEDICINE

## 2023-10-25 RX ORDER — HYDROCODONE BITARTRATE AND ACETAMINOPHEN 5; 325 MG/1; MG/1
1 TABLET ORAL EVERY 6 HOURS PRN
COMMUNITY
Start: 2023-10-19 | End: 2024-05-21

## 2023-10-25 ASSESSMENT — PAIN SCALES - GENERAL: PAINLEVEL: NO PAIN (0)

## 2023-10-25 NOTE — LETTER
October 25, 2023      Carey Dawson  [address] [place] [address]  [place] [address]        To Whom It May Concern:    Carey Dawson was seen in our clinic. She may return to work on Monday, October 30th  without restrictions. She should get breaks regularly to ambulate.       Sincerely,        ILIA STEWART, DO

## 2023-10-25 NOTE — PROGRESS NOTES
"  Assessment & Plan     Multiple subsegmental pulmonary emboli without acute cor pulmonale (H)  Acute, improved and hemodynamically stable. No signs of excessive bleeding. OK to continue for three month course. Likely provoked given both recent illness and prolonged travel. However will still refer to vascular to see if factor V testing indicated, and if there are other suggestions regarding anticoagulation.   - rivaroxaban ANTICOAGULANT (XARELTO) 20 MG TABS tablet  Dispense: 30 tablet; Refill: 1  - Vascular Medicine Referral    Screening for hyperlipidemia  - Lipid panel reflex to direct LDL Fasting    Screening for diabetes mellitus  - Glucose      Ordering of each unique test  Prescription drug management  I spent a total of 27 minutes on the day of the visit.   Time spent by me doing chart review, history and exam, documentation and further activities per the note     MED REC REQUIRED  Post Medication Reconciliation Status:  Patient was not discharged from an inpatient facility or TCU  BMI:   Estimated body mass index is 31.26 kg/m  as calculated from the following:    Height as of this encounter: 1.626 m (5' 4\").    Weight as of this encounter: 82.6 kg (182 lb 1.6 oz).   Weight management plan: Discussed healthy diet and exercise guidelines    See Patient Instructions    ILIA STEWART DO  [facility] Center[facility]ESSICA Patino is a 48 year old, presenting for the following health issues:  ER F/U        10/25/2023     2:50 PM   Additional Questions   Roomed by Roderick MENJIVAR   Accompanied by N/A       History of Present Illness       Reason for visit:  Follow up for return to work after ER for pulmonary embolism  Symptom onset:  1-2 weeks ago  Symptoms include:  None now. follow up after ER visit    She eats 0-1 servings of fruits and vegetables daily.She consumes 0 sweetened beverage(s) daily.She exercises with enough effort to increase her heart rate 20 to 29 minutes per day.  She " "exercises with enough effort to increase her heart rate 5 days per week.   She is taking medications regularly.       ED/UC Followup:    Facility:   Emergency Department   Date of visit: 10/14/2023  Reason for visit: Pulmonary embolism   Current Status: Pt states that she has been feeling back to normal     Denies DVT symptoms, but prior presenting symptoms, she had a road trip with multiple episodes of prolonged sitting.     Review of Systems   Constitutional, HEENT, cardiovascular, pulmonary, gi and gu systems are negative, except as otherwise noted.      Objective    BP (!) 135/95 (BP Location: Right arm, Patient Position: Sitting, Cuff Size: Adult Large)   Pulse 62   Temp 98.3  F (36.8  C) (Oral)   Resp 20   Ht 1.626 m (5' 4\")   Wt 82.6 kg (182 lb 1.6 oz)   LMP 10/07/2023 (Exact Date)   SpO2 99%   BMI 31.26 kg/m    Body mass index is 31.26 kg/m .  Physical Exam   GENERAL: healthy, alert and no distress  EYES: Eyes grossly normal to inspection, PERRL and conjunctivae and sclerae normal  NECK: no adenopathy, no asymmetry, masses, or scars and thyroid normal to palpation  MS: no gross musculoskeletal defects noted, no edema  SKIN: no suspicious lesions or rashes    No results found for any visits on 10/25/23.                "

## 2023-10-26 ENCOUNTER — NURSE TRIAGE (OUTPATIENT)
Dept: FAMILY MEDICINE | Facility: CLINIC | Age: 48
End: 2023-10-26
Payer: COMMERCIAL

## 2023-10-26 NOTE — TELEPHONE ENCOUNTER
Pt called triage to figure out why she was referred to an Oncology clinic for recent PE diagnosis.  Explained to pt that our clinic also sees Hematology pts, and since she was recently diagnosed with PE we will see her to evaluate reason for PE.  Explained to pt that she will meet with a Hematologist who will most likely order additional blood bood to see if anything explains why her developed PE.  Pt felt relieved and would like to proceed with scheduling her appt.  Instructed pt that message will be sent to scheduling so that pt can be scheduled.  Instructed pt to call clinic with any further questions or concerns.  She verbalized understanding of information.

## 2023-10-31 ENCOUNTER — TRANSCRIBE ORDERS (OUTPATIENT)
Dept: OTHER | Age: 48
End: 2023-10-31

## 2023-10-31 ENCOUNTER — TRANSCRIBE ORDERS (OUTPATIENT)
Dept: ONCOLOGY | Facility: CLINIC | Age: 48
End: 2023-10-31
Payer: COMMERCIAL

## 2023-10-31 DIAGNOSIS — I26.94 MULTIPLE SUBSEGMENTAL PULMONARY EMBOLI WITHOUT ACUTE COR PULMONALE (H): Primary | ICD-10-CM

## 2023-10-31 NOTE — TELEPHONE ENCOUNTER
Imaging Received  November 1, 2023 4:24 PM ABT   Action: Images from [facility] received and resolved to PACS.     RECORDS STATUS - ALL OTHER DIAGNOSIS      RECORDS RECEIVED FROM: Epic   DATE RECEIVED:    NOTES STATUS DETAILS   OFFICE NOTE from referring provider Epic 10/25/23: Dr. Catarina Casillas   DISCHARGE REPORT from the ER [facility] 10/14/23: [facility] ED   OPERATIVE REPORT     MEDICATION LIST [facility]    LABS     PATHOLOGY REPORTS     ANYTHING RELATED TO DIAGNOSIS [facility] Most recent 10/14/23   IMAGING (NEED IMAGES & REPORT)     CT SCANS PACS LV:  10/14/23: CTA Chest   XRAYS PACS LV:  10/14/23: XR Chest

## 2023-11-06 ENCOUNTER — APPOINTMENT (OUTPATIENT)
Dept: ULTRASOUND IMAGING | Facility: HOSPITAL | Age: 48
End: 2023-11-06
Payer: COMMERCIAL

## 2023-11-06 ENCOUNTER — NURSE TRIAGE (OUTPATIENT)
Dept: NURSING | Facility: CLINIC | Age: 48
End: 2023-11-06
Payer: COMMERCIAL

## 2023-11-06 ENCOUNTER — HOSPITAL ENCOUNTER (EMERGENCY)
Facility: HOSPITAL | Age: 48
Discharge: HOME | End: 2023-11-06
Payer: COMMERCIAL

## 2023-11-06 VITALS
DIASTOLIC BLOOD PRESSURE: 97 MMHG | OXYGEN SATURATION: 99 % | TEMPERATURE: 99.1 F | HEART RATE: 65 BPM | SYSTOLIC BLOOD PRESSURE: 156 MMHG | RESPIRATION RATE: 16 BRPM

## 2023-11-06 DIAGNOSIS — N93.9 VAGINAL BLEEDING: ICD-10-CM

## 2023-11-06 DIAGNOSIS — Z79.01 ANTICOAGULATED: ICD-10-CM

## 2023-11-06 LAB
ABO/RH(D): NORMAL
ANION GAP SERPL CALCULATED.3IONS-SCNC: 8 MMOL/L (ref 7–15)
ANTIBODY SCREEN: NEGATIVE
BASOPHILS # BLD AUTO: 0.1 10E3/UL (ref 0–0.2)
BASOPHILS NFR BLD AUTO: 1 %
BUN SERPL-MCNC: 9.4 MG/DL (ref 6–20)
CALCIUM SERPL-MCNC: 8.9 MG/DL (ref 8.6–10)
CHLORIDE SERPL-SCNC: 105 MMOL/L (ref 98–107)
CREAT SERPL-MCNC: 0.9 MG/DL (ref 0.51–0.95)
DEPRECATED HCO3 PLAS-SCNC: 28 MMOL/L (ref 22–29)
EGFRCR SERPLBLD CKD-EPI 2021: 78 ML/MIN/1.73M2
EOSINOPHIL # BLD AUTO: 0.1 10E3/UL (ref 0–0.7)
EOSINOPHIL NFR BLD AUTO: 1 %
ERYTHROCYTE [DISTWIDTH] IN BLOOD BY AUTOMATED COUNT: 13.8 % (ref 10–15)
GLUCOSE SERPL-MCNC: 107 MG/DL (ref 70–99)
HCT VFR BLD AUTO: 38 % (ref 35–47)
HGB BLD-MCNC: 12.2 G/DL (ref 11.7–15.7)
HOLD SPECIMEN: NORMAL
HOLD SPECIMEN: NORMAL
IMM GRANULOCYTES # BLD: 0 10E3/UL
IMM GRANULOCYTES NFR BLD: 0 %
LYMPHOCYTES # BLD AUTO: 1.9 10E3/UL (ref 0.8–5.3)
LYMPHOCYTES NFR BLD AUTO: 25 %
MCH RBC QN AUTO: 28.1 PG (ref 26.5–33)
MCHC RBC AUTO-ENTMCNC: 32.1 G/DL (ref 31.5–36.5)
MCV RBC AUTO: 88 FL (ref 78–100)
MONOCYTES # BLD AUTO: 0.3 10E3/UL (ref 0–1.3)
MONOCYTES NFR BLD AUTO: 4 %
NEUTROPHILS # BLD AUTO: 5.4 10E3/UL (ref 1.6–8.3)
NEUTROPHILS NFR BLD AUTO: 69 %
NRBC # BLD AUTO: 0 10E3/UL
NRBC BLD AUTO-RTO: 0 /100
PLATELET # BLD AUTO: 301 10E3/UL (ref 150–450)
POTASSIUM SERPL-SCNC: 4 MMOL/L (ref 3.4–5.3)
RBC # BLD AUTO: 4.34 10E6/UL (ref 3.8–5.2)
SODIUM SERPL-SCNC: 141 MMOL/L (ref 135–145)
SPECIMEN EXPIRATION DATE: NORMAL
WBC # BLD AUTO: 7.8 10E3/UL (ref 4–11)

## 2023-11-06 PROCEDURE — 76830 TRANSVAGINAL US NON-OB: CPT

## 2023-11-06 PROCEDURE — 36415 COLL VENOUS BLD VENIPUNCTURE: CPT | Performed by: STUDENT IN AN ORGANIZED HEALTH CARE EDUCATION/TRAINING PROGRAM

## 2023-11-06 PROCEDURE — 80048 BASIC METABOLIC PNL TOTAL CA: CPT | Performed by: STUDENT IN AN ORGANIZED HEALTH CARE EDUCATION/TRAINING PROGRAM

## 2023-11-06 PROCEDURE — 85004 AUTOMATED DIFF WBC COUNT: CPT | Performed by: STUDENT IN AN ORGANIZED HEALTH CARE EDUCATION/TRAINING PROGRAM

## 2023-11-06 PROCEDURE — 99284 EMERGENCY DEPT VISIT MOD MDM: CPT | Mod: 25

## 2023-11-06 PROCEDURE — 86850 RBC ANTIBODY SCREEN: CPT | Performed by: STUDENT IN AN ORGANIZED HEALTH CARE EDUCATION/TRAINING PROGRAM

## 2023-11-06 PROCEDURE — 86901 BLOOD TYPING SEROLOGIC RH(D): CPT | Performed by: STUDENT IN AN ORGANIZED HEALTH CARE EDUCATION/TRAINING PROGRAM

## 2023-11-06 ASSESSMENT — ENCOUNTER SYMPTOMS
CHILLS: 0
PALPITATIONS: 0
WEAKNESS: 0
BRUISES/BLEEDS EASILY: 1
BLOOD IN STOOL: 0
ABDOMINAL PAIN: 0
CONSTIPATION: 0
NAUSEA: 0
DYSURIA: 0
SHORTNESS OF BREATH: 0
DIZZINESS: 0
DIARRHEA: 0
FEVER: 0
VOMITING: 0
LIGHT-HEADEDNESS: 0
HEMATURIA: 0
COLOR CHANGE: 0

## 2023-11-06 NOTE — TELEPHONE ENCOUNTER
"Nurse Triage SBAR    Is this a 2nd Level Triage? YES, LICENSED PRACTITIONER REVIEW IS REQUIRED    Situation:   Abnormal vaginal bleeding    Background:   Pt started Xarelto 20 mg daily on 10/14 due to pulmonary embolism.  Referred to see hematology on 11/8 to determine underlying cause of multiple PE.     Assessment:     Period started on 10/30, with mild to moderate flow from 10/30 to 11/3.  11/4 Saturday, pt started having \"gush\", severe vaginal bleeding. Needing to change super tampons every 1-2 hours. Pt states it was normal for her to have heavier bleeding towards the end of her period, but only lasts for 1 day.   Pt continues to have moderate to severe bleeding until today (day 3), passing blood clots and soaks super tampons every 2 hours    No dizziness  Pt still strong enough to move around, no fatigue.    Pt was planning to go to work. FNA advised okay to call out of work due to severe vaginal bleeding, in case PCP would like her seen at ED or clinic. Pt describes that passes blood clots and \"bathroom looks like a crime scene\" with the heavy bleeding.    Protocol Recommended Disposition:   Go To ED/UCC Now (Or To Office With PCP Approval)    Recommendation:   2nd level triage. Pt saw PCP on 10/25.  ED if she starts having dizziness or severe fatigue  Pt verbalized understanding.    Routed to provider  Please call pt [phone]. ADS is appropriate if PCP agrees.    Does the patient meet one of the following criteria for ADS visit consideration? 16+ years old, with an [facility] PCP     TIP  Providers, please consider if this condition is appropriate for management at one of our Acute and Diagnostic Services sites.     If patient is a good candidate, please use dotphrase <dot>triageresponse and select Refer to ADS to document.    Yumiko Valderrama RN/[facility] Nurse Advisor      Reason for Disposition   SEVERE vaginal bleeding (e.g., soaking 2 pads or tampons per hour and present 2 or more hours; 1 menstrual cup " every 2 hours)    Additional Information   Negative: SEVERE vaginal bleeding (e.g., continuous red blood from vagina, or large blood clots) and very weak (can't stand)   Negative: Passed out (i.e., fainted, collapsed and was not responding)   Negative: Difficult to awaken or acting confused (e.g., disoriented, slurred speech)   Negative: Shock suspected (e.g., cold/pale/clammy skin, too weak to stand, low BP, rapid pulse)   Negative: Sounds like a life-threatening emergency to the triager   Negative: Pregnant 20 or more weeks (5 months or more)   Negative: Pregnant < 20 weeks (less than 5 months)   Negative: Postpartum (from 0 to 6 weeks after delivery)   Negative: Vaginal discharge is main symptom and bleeding is slight   Negative: SEVERE abdominal pain (e.g., excruciating)   Negative: SEVERE dizziness (e.g., unable to stand, requires support to walk, feels like passing out now)    Protocols used: Vaginal Bleeding - [id number]

## 2023-11-06 NOTE — ED TRIAGE NOTES
"Patient placed on xarelto due to PE on 14 OCT. Started on menses 1 week ago. Has had increased bleeding x 3 days. Has saturated 5 \"super\" tampons today and is passing blood clots. Denies dizziness, shortness of breath or pain.        "

## 2023-11-06 NOTE — ED PROVIDER NOTES
EMERGENCY DEPARTMENT ENCOUNTER      NAME: Carey Dawson  AGE: 48 year old female  YOB: 1975  MRN: [id number]  EVALUATION DATE & TIME: No admission date for patient encounter.    PCP: Catarina Casillas    ED PROVIDER: Sherita Tilley PA-C      Chief Complaint   Patient presents with    Vaginal Bleeding         FINAL IMPRESSION:  1. Vaginal bleeding    2. Anticoagulated          ED COURSE & MEDICAL DECISION MAKIN:38 PM Met with patient for initial interview. Plan for care discussed.  4:00 PM Reevaluated and updated patient. I discussed the plan for discharge with the patient, and patient is agreeable. We discussed supportive cares at home and reasons for return to the ER including new or worsening symptoms. All questions and concerns addressed. Patient to be discharged by RN.    48 year old female with a history of PE (on Xarelto) presents to the Emergency Department for evaluation of vaginal bleeding.  Per chart review, patient was seen in the ED on 10/14/2023 with a PE and started on Xarelto.  Upon exam, patient is afebrile, hypertensive, but in no acute distress. Abdomen soft and non-tender. Differential diagnosis includes but not limited to uterine hemorrhage, fibroids, anemia, electrolyte abnormality. Based on patient's presenting symptoms, laboratories and imaging were ordered.    CBC without leukocytosis or anemia. BMP with mild hyperglycemia at 107, otherwise WNL. US revealed diffusely thickened endometrium with blood products, no polyp, small subserosal small uterine fibroid.    Upon reevaluation, patient reports bleeding has decreased and she has not gone through an additional tampon or pad while in the ED and patient remaining hemodynamically stable with no signs of anemia. Symptoms and workup most consistent with menorrhagia in setting of anticoagulation. Patient was made aware of the above findings. Plan to discharge patient home with symptomatic management, strict return  precautions, and close follow up with their PCP and hematology as scheduled for reevaluation and ongoing management. The patient was stable and well appearing upon discharge. The patient was advised to return to the ER if any new or worsening symptoms develop. The patient verbalizes understanding and agrees with the plan.     Medical Decision Making    History:  Supplemental history from: Documented in chart, if applicable  External Record(s) reviewed: Documented in chart, if applicable.    Work Up:  Chart documentation includes differential considered and any EKGs or imaging independently interpreted by provider, where specified.  In additional to work up documented, I considered the following work up: Documented in chart, if applicable.    External consultation:  Discussion of management with another provider: Documented in chart, if applicable    Complicating factors:  Care impacted by chronic illness: Anticoagulated State  Care affected by social determinants of health: N/A    Disposition considerations: Discharge. No recommendations on prescription strength medication(s). See documentation for any additional details.        MEDICATIONS GIVEN IN THE EMERGENCY:  Medications - No data to display    NEW PRESCRIPTIONS STARTED AT TODAY'S ER VISIT  New Prescriptions    No medications on file          =================================================================    HPI    Patient information was obtained from: patient    Use of : N/A      Carey Dawson is a 48 year old female with a pertinent history of PE (on Xarelto) who presents to this ED for evaluation of vaginal bleeding.  Per chart review, patient was seen on 10/14/2023 diagnosed with PE and started on Xarelto.  She reports PE may have been provoked by recent long car ride; however, she does have follow-up with hematology later this week to assess for clotting disorders.  She notes this is the first.  She has had some starting Xarelto.  She reports  first day of her period was on Monday with typical bleeding.  However, over the last 3 days bleeding has increased and she is passing teaspoon sized clots and saturating super tampons, 5 within 5 hours prior to arrival.  Of note, patient reports that she had a super tampon in place which was approximately half saturated when she removed this to place a pad for imaging.  She reports bleeding has decreased since she has arrived.  She denies any lightheadedness, dizziness, shortness of breath, chest pain, abdominal pain, vaginal discharge, urinary symptoms, fevers or chills.      REVIEW OF SYSTEMS   Review of Systems   Constitutional:  Negative for chills and fever.   Respiratory:  Negative for shortness of breath.    Cardiovascular:  Negative for chest pain and palpitations.   Gastrointestinal:  Negative for abdominal pain, blood in stool, constipation, diarrhea, nausea and vomiting.   Genitourinary:  Positive for vaginal bleeding. Negative for dysuria, hematuria, pelvic pain and vaginal discharge.   Skin:  Negative for color change.   Neurological:  Negative for dizziness, weakness and light-headedness.   Hematological:  Bruises/bleeds easily.     PAST MEDICAL HISTORY:  No past medical history on file.    PAST SURGICAL HISTORY:  No past surgical history on file.        CURRENT MEDICATIONS:    albuterol (PROAIR HFA/PROVENTIL HFA/VENTOLIN HFA) 108 (90 Base) MCG/ACT inhaler  HYDROcodone-acetaminophen (NORCO) 5-325 MG tablet  rivaroxaban ANTICOAGULANT (XARELTO) 15 MG TABS tablet  [START ON 11/10/2023] rivaroxaban ANTICOAGULANT (XARELTO) 20 MG TABS tablet        ALLERGIES:  No Known Allergies    FAMILY HISTORY:  No family history on file.    SOCIAL HISTORY:   Social History     Socioeconomic History    Marital status:    Tobacco Use    Smoking status: Never    Smokeless tobacco: Never   Substance and Sexual Activity    Alcohol use: Yes     Alcohol/week: 7.0 standard drinks of alcohol     Types: 7 Standard drinks or  equivalent per week    Drug use: Never     Social Determinants of Health     Financial Resource Strain: Low Risk  (10/23/2023)    Financial Resource Strain     Within the past 12 months, have you or your family members you live with been unable to get utilities (heat, electricity) when it was really needed?: No   Food Insecurity: Low Risk  (10/23/2023)    Food Insecurity     Within the past 12 months, did you worry that your food would run out before you got money to buy more?: No     Within the past 12 months, did the food you bought just not last and you didn t have money to get more?: No   Transportation Needs: Low Risk  (10/23/2023)    Transportation Needs     Within the past 12 months, has lack of transportation kept you from medical appointments, getting your medicines, non-medical meetings or appointments, work, or from getting things that you need?: No   Interpersonal Safety: Low Risk  (10/25/2023)    Interpersonal Safety     Do you feel physically and emotionally safe where you currently live?: Yes     Within the past 12 months, have you been hit, slapped, kicked or otherwise physically hurt by someone?: No     Within the past 12 months, have you been humiliated or emotionally abused in other ways by your partner or ex-partner?: No   Housing Stability: Low Risk  (10/23/2023)    Housing Stability     Do you have housing? : Yes     Are you worried about losing your housing?: No       VITALS:  BP (!) 156/97   Pulse 65   Temp 99.1  F (37.3  C) (Temporal)   Resp 16   LMP 10/07/2023 (Exact Date)   SpO2 99%     PHYSICAL EXAM    Constitutional:  Alert, in no acute distress. Cooperative.  EYES: Conjunctivae clear.   HENT:  Atraumatic, normocephalic.  Respiratory:  Respirations even, unlabored, in no acute respiratory distress. Lungs clear to auscultation bilaterally without wheeze, rhonchi, or rales. No cough. Speaks in full sentences easily.  Cardiovascular:  Regular rate and rhythm, good peripheral perfusion.  No peripheral edema. No chest wall tenderness.  GI: Soft, flat, non-distended. Bowel sounds normal. No tenderness to palpation. No guarding, rebound, or other peritoneal signs.  Musculoskeletal:  No edema. No cyanosis. Range of motion major extremities intact.    Integument: Warm, Dry. No rash to visualized skin.   Neurologic:  Alert & oriented. No focal deficits noted.   Psych: Normal mood and affect.      LAB:  All pertinent labs reviewed and interpreted.  Results for orders placed or performed during the hospital encounter of 11/06/23   US Pelvic Complete with Transvaginal    Impression    IMPRESSION:  1.  Diffusely thickened endometrium with blood products visualized in the endometrial canal. No focal thickening to suggest polyp.    2.  Small subserosal uterine fibroid.    3.  Normal ovaries.       Basic metabolic panel   Result Value Ref Range    Sodium 141 135 - 145 mmol/L    Potassium 4.0 3.4 - 5.3 mmol/L    Chloride 105 98 - 107 mmol/L    Carbon Dioxide (CO2) 28 22 - 29 mmol/L    Anion Gap 8 7 - 15 mmol/L    Urea Nitrogen 9.4 6.0 - 20.0 mg/dL    Creatinine 0.90 0.51 - 0.95 mg/dL    GFR Estimate 78 >60 mL/min/1.73m2    Calcium 8.9 8.6 - 10.0 mg/dL    Glucose 107 (H) 70 - 99 mg/dL   Extra Blue Top Tube   Result Value Ref Range    Hold Specimen [facility]    Extra Red Top Tube   Result Value Ref Range    Hold Specimen [facility]    CBC with platelets and differential   Result Value Ref Range    WBC Count 7.8 4.0 - 11.0 10e3/uL    RBC Count 4.34 3.80 - 5.20 10e6/uL    Hemoglobin 12.2 11.7 - 15.7 g/dL    Hematocrit 38.0 35.0 - 47.0 %    MCV 88 78 - 100 fL    MCH 28.1 26.5 - 33.0 pg    MCHC 32.1 31.5 - 36.5 g/dL    RDW 13.8 10.0 - 15.0 %    Platelet Count 301 150 - 450 10e3/uL    % Neutrophils 69 %    % Lymphocytes 25 %    % Monocytes 4 %    % Eosinophils 1 %    % Basophils 1 %    % Immature Granulocytes 0 %    NRBCs per 100 WBC 0 <1 /100    Absolute Neutrophils 5.4 1.6 - 8.3 10e3/uL    Absolute Lymphocytes 1.9 0.8 - 5.3  10e3/uL    Absolute Monocytes 0.3 0.0 - 1.3 10e3/uL    Absolute Eosinophils 0.1 0.0 - 0.7 10e3/uL    Absolute Basophils 0.1 0.0 - 0.2 10e3/uL    Absolute Immature Granulocytes 0.0 <=0.4 10e3/uL    Absolute NRBCs 0.0 10e3/uL   Adult Type and Screen   Result Value Ref Range    ABO/RH(D) A POS     Antibody Screen Negative Negative    SPECIMEN EXPIRATION DATE [id number]        RADIOLOGY:  Reviewed all pertinent imaging. Please see official radiology report.  US Pelvic Complete with Transvaginal   Final Result   IMPRESSION:   1.  Diffusely thickened endometrium with blood products visualized in the endometrial canal. No focal thickening to suggest polyp.      2.  Small subserosal uterine fibroid.      3.  Normal ovaries.              Sherita Tilley PA-C  [facility] EMERGENCY DEPARTMENT  [address] [id number] [phone]      Sherita Tilley PA-C  11/06/23 [id number]

## 2023-11-06 NOTE — DISCHARGE INSTRUCTIONS
You are seen in the ER for evaluation of vaginal bleeding.  Your lab work and imaging was reassuring as discussed.    Tylenol (Acetaminophen) Discharge Instructions:  You may take 2 tablets of regular strength, over-the-counter, Tylenol (acetaminophen) every 4-6 hours as needed for pain.  Take no more than 4000 mg of Tylenol in a 24-hour period.      Avoid taking more than 1 acetaminophen-containing product at a time and be aware that many over-the-counter medications contain a combination of acetaminophen and other products.  If you are taking Tylenol in addition to a combination product please keep track of your daily acetaminophen dose to make sure you do not exceed the recommended 4000 mg.  Taking too much acetaminophen can cause permanent damage to your liver.    Follow-up with your primary care provider for reevaluation, especially if symptoms persist.    Return to the emergency department for any new or worsening symptoms including using a pad/tampon once every hour, abdominal pain, chest pain, shortness of breath, palpitations, dizziness/lightheadedness, or any other concerning symptoms.

## 2023-11-08 ENCOUNTER — ONCOLOGY VISIT (OUTPATIENT)
Dept: ONCOLOGY | Facility: CLINIC | Age: 48
End: 2023-11-08
Attending: FAMILY MEDICINE
Payer: COMMERCIAL

## 2023-11-08 ENCOUNTER — PRE VISIT (OUTPATIENT)
Dept: ONCOLOGY | Facility: CLINIC | Age: 48
End: 2023-11-08
Payer: COMMERCIAL

## 2023-11-08 VITALS
HEIGHT: 64 IN | DIASTOLIC BLOOD PRESSURE: 101 MMHG | SYSTOLIC BLOOD PRESSURE: 139 MMHG | HEART RATE: 72 BPM | BODY MASS INDEX: 30.73 KG/M2 | RESPIRATION RATE: 18 BRPM | OXYGEN SATURATION: 100 % | WEIGHT: 180 LBS

## 2023-11-08 DIAGNOSIS — I26.99 ACUTE PULMONARY EMBOLISM WITHOUT ACUTE COR PULMONALE, UNSPECIFIED PULMONARY EMBOLISM TYPE (H): Primary | ICD-10-CM

## 2023-11-08 PROCEDURE — 99204 OFFICE O/P NEW MOD 45 MIN: CPT | Performed by: INTERNAL MEDICINE

## 2023-11-08 PROCEDURE — 99213 OFFICE O/P EST LOW 20 MIN: CPT | Performed by: INTERNAL MEDICINE

## 2023-11-08 ASSESSMENT — ENCOUNTER SYMPTOMS
MUSCULOSKELETAL NEGATIVE: 1
PSYCHIATRIC NEGATIVE: 1
ENDOCRINE NEGATIVE: 1
EYES NEGATIVE: 1
SHORTNESS OF BREATH: 1
PALPITATIONS: 1
GASTROINTESTINAL NEGATIVE: 1
BRUISES/BLEEDS EASILY: 1
NEUROLOGICAL NEGATIVE: 1
CONSTITUTIONAL NEGATIVE: 1

## 2023-11-08 ASSESSMENT — PAIN SCALES - GENERAL: PAINLEVEL: NO PAIN (0)

## 2023-11-08 NOTE — LETTER
11/8/2023         RE: Carey Dawson  [address]  [place] [address]        Dear Colleague,    Thank you for referring your patient, Carey Dawson, to the [facility]. Please see a copy of my visit note below.    Assessment & Plan  Single episode of pulmonary emboli, possibly provoked after prolonged travel    Continue Xarelto for minimum of 3 months, maximum of 6 months  No indication for thrombophilia evaluation, does not need hematology follow up  Assist if possible with coverage for drug cost    History  This is an initial hematology consultation visit for this [employer] customer . She has been quite healthy but took a very long road trip with her , then shortly thereafter developed breathlessness and chest discomfort.  She was found to have multiple subsegmental pulmonary emboli and started on Xarelto, which she has now taken for about 6 weeks without problems.  She did not have a lower extremity ultrasound, but did have an EKG (normal).    There is no family history of thrombosis.      ECOG = 0    Patient Active Problem List   Diagnosis     Exercise induced bronchospasm     Cervical high risk HPV (human papillomavirus) test positive     Pulmonary embolism (H)     Current Outpatient Medications   Medication     albuterol (PROAIR HFA/PROVENTIL HFA/VENTOLIN HFA) 108 (90 Base) MCG/ACT inhaler     HYDROcodone-acetaminophen (NORCO) 5-325 MG tablet     rivaroxaban ANTICOAGULANT (XARELTO) 15 MG TABS tablet     [START ON 11/10/2023] rivaroxaban ANTICOAGULANT (XARELTO) 20 MG TABS tablet     No current facility-administered medications for this visit.     No past medical history on file.  No past surgical history on file.  Socioeconomic History     Marital status:      Social Determinants of Health     Interpersonal Safety: Low Risk  (10/25/2023)    Interpersonal Safety      Do you feel physically and emotionally safe where you currently live?: Yes      Within  "the past 12 months, have you been hit, slapped, kicked or otherwise physically hurt by someone?: No      Within the past 12 months, have you been humiliated or emotionally abused in other ways by your partner or ex-partner?: No     Review of Systems   Constitutional: Negative.    HENT:  Negative.     Eyes: Negative.    Respiratory:  Positive for shortness of breath (with PE, this has resolved).    Cardiovascular:  Positive for chest pain (now resolved) and palpitations (resolved).   Gastrointestinal: Negative.    Endocrine: Negative.    Genitourinary:  Positive for vaginal bleeding (recent ED visit for heavy bleeding on blood thinners, periods have always been heavy).    Musculoskeletal: Negative.    Skin: Negative.    Neurological: Negative.    Hematological:  Bruises/bleeds easily.   Psychiatric/Behavioral: Negative.     All other systems reviewed and are negative.      BP (!) 139/101   Pulse 72   Resp 18   Ht 1.626 m (5' 4\")   Wt 81.6 kg (180 lb)   LMP 10/07/2023 (Exact Date)   SpO2 100%   BMI 30.90 kg/m      Physical Exam  Constitutional:       Appearance: Normal appearance. She is well-developed, well-groomed and overweight.      Comments: Cheerful and relaxed   HENT:      Head: Normocephalic and atraumatic.      Mouth/Throat:      Mouth: Mucous membranes are moist.      Dentition: Normal dentition. No dental caries.   Eyes:      Extraocular Movements: Extraocular movements intact.      Conjunctiva/sclera: Conjunctivae normal.      Pupils: Pupils are equal, round, and reactive to light.   Cardiovascular:      Rate and Rhythm: Normal rate and regular rhythm.      Heart sounds: Normal heart sounds.   Pulmonary:      Effort: Pulmonary effort is normal.      Breath sounds: Normal breath sounds.   Abdominal:      General: There is no distension.      Palpations: Abdomen is soft. There is no mass.      Tenderness: There is no abdominal tenderness. There is no guarding.   Musculoskeletal:         General: No " deformity.      Cervical back: Normal range of motion and neck supple.      Right lower leg: No edema.      Left lower leg: No edema.   Lymphadenopathy:      Cervical: No cervical adenopathy.      Upper Body:      Right upper body: No axillary or epitrochlear adenopathy.      Left upper body: No axillary or epitrochlear adenopathy.   Skin:     General: Skin is warm and dry.   Neurological:      General: No focal deficit present.      Mental Status: She is alert and oriented to person, place, and time.      Cranial Nerves: No cranial nerve deficit.      Motor: No weakness.      Gait: Gait normal.      Deep Tendon Reflexes: Reflexes normal.   Psychiatric:         Mood and Affect: Mood normal.         Behavior: Behavior normal. Behavior is cooperative.         Thought Content: Thought content normal.         Judgment: Judgment normal.         Recent Results (from the past 720 hour(s))   Basic metabolic panel    Collection Time: 11/06/23  1:27 PM   Result Value Ref Range    Sodium 141 135 - 145 mmol/L    Potassium 4.0 3.4 - 5.3 mmol/L    Chloride 105 98 - 107 mmol/L    Carbon Dioxide (CO2) 28 22 - 29 mmol/L    Anion Gap 8 7 - 15 mmol/L    Urea Nitrogen 9.4 6.0 - 20.0 mg/dL    Creatinine 0.90 0.51 - 0.95 mg/dL    GFR Estimate 78 >60 mL/min/1.73m2    Calcium 8.9 8.6 - 10.0 mg/dL    Glucose 107 (H) 70 - 99 mg/dL   Extra Blue Top Tube    Collection Time: 11/06/23  1:27 PM   Result Value Ref Range    Hold Specimen [facility]    Extra Red Top Tube    Collection Time: 11/06/23  1:27 PM   Result Value Ref Range    Hold Specimen [facility]    Adult Type and Screen    Collection Time: 11/06/23  1:27 PM   Result Value Ref Range    ABO/RH(D) A POS     Antibody Screen Negative Negative    SPECIMEN EXPIRATION DATE [id number]    CBC with platelets and differential    Collection Time: 11/06/23  1:27 PM   Result Value Ref Range    WBC Count 7.8 4.0 - 11.0 10e3/uL    RBC Count 4.34 3.80 - 5.20 10e6/uL    Hemoglobin 12.2 11.7 - 15.7 g/dL     Hematocrit 38.0 35.0 - 47.0 %    MCV 88 78 - 100 fL    MCH 28.1 26.5 - 33.0 pg    MCHC 32.1 31.5 - 36.5 g/dL    RDW 13.8 10.0 - 15.0 %    Platelet Count 301 150 - 450 10e3/uL    % Neutrophils 69 %    % Lymphocytes 25 %    % Monocytes 4 %    % Eosinophils 1 %    % Basophils 1 %    % Immature Granulocytes 0 %    NRBCs per 100 WBC 0 <1 /100    Absolute Neutrophils 5.4 1.6 - 8.3 10e3/uL    Absolute Lymphocytes 1.9 0.8 - 5.3 10e3/uL    Absolute Monocytes 0.3 0.0 - 1.3 10e3/uL    Absolute Eosinophils 0.1 0.0 - 0.7 10e3/uL    Absolute Basophils 0.1 0.0 - 0.2 10e3/uL    Absolute Immature Granulocytes 0.0 <=0.4 10e3/uL    Absolute NRBCs 0.0 10e3/uL     Recent Results (from the past 744 hour(s))   XR Chest 2 Views    Narrative    EXAM: XR CHEST 2 VIEWS  LOCATION: [facility]  DATE: 10/14/2023    INDICATION: Pain.  COMPARISON: None.    IMPRESSION: Mild bibasilar atelectasis. No pleural effusion. The cardiac silhouette and pulmonary vasculature are normal.   CTA Chest with Contrast    Narrative    EXAM: CT ANGIO CHEST W IV CONT PE STUDY  LOCATION: [facility]  DATE: 10/14/2023    INDICATION: Chest pain.  COMPARISON: None.  TECHNIQUE: CT chest pulmonary angiogram during arterial phase injection of IV contrast. Multiplanar reformats and MIP reconstructions were performed. Dose reduction techniques were used.   CONTRAST: IOHEXOL 350 MG/ML IV SOLN 75 mL    FINDINGS:  ANGIOGRAM CHEST: There are a few small segmental and subsegmental emboli within the lung bases and lingula. A few small emboli also seen in the right upper and middle lobes. No evidence for right heart strain. Thoracic aorta negative for dissection or aneurysm.    LUNGS AND PLEURA: Mild subpleural infiltrate left lower lobe and lingula.    MEDIASTINUM/AXILLAE: Normal.    CORONARY ARTERY CALCIFICATION: None.    UPPER ABDOMEN: Fatty liver.    MUSCULOSKELETAL: Normal.    IMPRESSION:  1.  Few small segmental and subsegmental pulmonary emboli in periphery of the  "lower lobes and within the lingula. Also a few small emboli in the right upper and middle lobes. No evidence for right heart strain.    2.  Mild infiltrate lingula and left lower lobe.    3.  Fatty liver.    Results given to Dr. Abilio Nava   US Pelvic Complete with Transvaginal    Narrative    EXAM: US PELVIC TRANSABDOMINAL AND TRANSVAGINAL  LOCATION: [facility]  DATE: 11/6/2023    INDICATION: vaginal bleeding tampon hr with clots, on xarelto  COMPARISON: 4/18/2006  TECHNIQUE: Transabdominal scans were performed. Endovaginal ultrasound was performed to better visualize the adnexa.    FINDINGS:    UTERUS: 10.3 x 6.7 x 4.8 cm. Subserosal fibroid in the left side of the uterus measuring 2.5 x 2.4 x 3.1 cm. No submucosal component. Simple nabothian cysts.    ENDOMETRIUM: 19 mm. Diffusely thickened with no areas of internal vascularity. Echogenic material in the endometrial canal.    RIGHT OVARY: 1.9 x 1.7 x 2.0 cm. Normal.    LEFT OVARY: 2.9 x 1.9 x 1.8 cm. Normal.    No significant free fluid.      Impression    IMPRESSION:  1.  Diffusely thickened endometrium with blood products visualized in the endometrial canal. No focal thickening to suggest polyp.    2.  Small subserosal uterine fibroid.    3.  Normal ovaries.           Oncology Rooming Note    November 8, 2023 8:54 AM   Carey Dawson is a 48 year old female who presents for:    Chief Complaint   Patient presents with     Hematology     New consult Pulmonary embolism     Initial Vitals: BP (!) 139/101   Pulse 72   Resp 18   Ht 1.626 m (5' 4\")   Wt 81.6 kg (180 lb)   LMP 10/07/2023 (Exact Date)   SpO2 100%   BMI 30.90 kg/m   Estimated body mass index is 30.9 kg/m  as calculated from the following:    Height as of this encounter: 1.626 m (5' 4\").    Weight as of this encounter: 81.6 kg (180 lb). Body surface area is 1.92 meters squared.  No Pain (0) Comment: Data Unavailable   Patient's last menstrual period was 10/07/2023 (exact " date).  Allergies reviewed: Yes  Medications reviewed: Yes    Medications: Medication refills not needed today.  Pharmacy name entered into [employer]: [facility] #[id number] [place] [id number] AT [facility] [address]    Clinical concerns:  new consult Pulmonary embolism       Joleen Car                Again, thank you for allowing me to participate in the care of your patient.        Sincerely,        Pauline Duncan MD

## 2023-11-08 NOTE — PROGRESS NOTES
"Oncology Rooming Note    November 8, 2023 8:54 AM   Carey Dawson is a 48 year old female who presents for:    Chief Complaint   Patient presents with    Hematology     New consult Pulmonary embolism     Initial Vitals: BP (!) 139/101   Pulse 72   Resp 18   Ht 1.626 m (5' 4\")   Wt 81.6 kg (180 lb)   LMP 10/07/2023 (Exact Date)   SpO2 100%   BMI 30.90 kg/m   Estimated body mass index is 30.9 kg/m  as calculated from the following:    Height as of this encounter: 1.626 m (5' 4\").    Weight as of this encounter: 81.6 kg (180 lb). Body surface area is 1.92 meters squared.  No Pain (0) Comment: Data Unavailable   Patient's last menstrual period was 10/07/2023 (exact date).  Allergies reviewed: Yes  Medications reviewed: Yes    Medications: Medication refills not needed today.  Pharmacy name entered into [employer]: [facility] #[id number] [address] AT [facility] [address]    Clinical concerns:  new consult Pulmonary embolism       Joleen Car              "

## 2023-11-08 NOTE — PROGRESS NOTES
Assessment & Plan   Single episode of pulmonary emboli, possibly provoked after prolonged travel    Continue Xarelto for minimum of 3 months, maximum of 6 months  No indication for thrombophilia evaluation, does not need hematology follow up  Assist if possible with coverage for drug cost    History  This is an initial hematology consultation visit for this [employer] customer . She has been quite healthy but took a very long road trip with her , then shortly thereafter developed breathlessness and chest discomfort.  She was found to have multiple subsegmental pulmonary emboli and started on Xarelto, which she has now taken for about 6 weeks without problems.  She did not have a lower extremity ultrasound, but did have an EKG (normal).    There is no family history of thrombosis.      ECOG = 0    Patient Active Problem List   Diagnosis    Exercise induced bronchospasm    Cervical high risk HPV (human papillomavirus) test positive    Pulmonary embolism (H)     Current Outpatient Medications   Medication    albuterol (PROAIR HFA/PROVENTIL HFA/VENTOLIN HFA) 108 (90 Base) MCG/ACT inhaler    HYDROcodone-acetaminophen (NORCO) 5-325 MG tablet    rivaroxaban ANTICOAGULANT (XARELTO) 15 MG TABS tablet    [START ON 11/10/2023] rivaroxaban ANTICOAGULANT (XARELTO) 20 MG TABS tablet     No current facility-administered medications for this visit.     No past medical history on file.  No past surgical history on file.  Socioeconomic History    Marital status:      Social Determinants of Health     Interpersonal Safety: Low Risk  (10/25/2023)    Interpersonal Safety     Do you feel physically and emotionally safe where you currently live?: Yes     Within the past 12 months, have you been hit, slapped, kicked or otherwise physically hurt by someone?: No     Within the past 12 months, have you been humiliated or emotionally abused in other ways by your partner or ex-partner?: No     Review of Systems  "  Constitutional: Negative.    HENT:  Negative.     Eyes: Negative.    Respiratory:  Positive for shortness of breath (with PE, this has resolved).    Cardiovascular:  Positive for chest pain (now resolved) and palpitations (resolved).   Gastrointestinal: Negative.    Endocrine: Negative.    Genitourinary:  Positive for vaginal bleeding (recent ED visit for heavy bleeding on blood thinners, periods have always been heavy).    Musculoskeletal: Negative.    Skin: Negative.    Neurological: Negative.    Hematological:  Bruises/bleeds easily.   Psychiatric/Behavioral: Negative.     All other systems reviewed and are negative.      BP (!) 139/101   Pulse 72   Resp 18   Ht 1.626 m (5' 4\")   Wt 81.6 kg (180 lb)   LMP 10/07/2023 (Exact Date)   SpO2 100%   BMI 30.90 kg/m      Physical Exam  Constitutional:       Appearance: Normal appearance. She is well-developed, well-groomed and overweight.      Comments: Cheerful and relaxed   HENT:      Head: Normocephalic and atraumatic.      Mouth/Throat:      Mouth: Mucous membranes are moist.      Dentition: Normal dentition. No dental caries.   Eyes:      Extraocular Movements: Extraocular movements intact.      Conjunctiva/sclera: Conjunctivae normal.      Pupils: Pupils are equal, round, and reactive to light.   Cardiovascular:      Rate and Rhythm: Normal rate and regular rhythm.      Heart sounds: Normal heart sounds.   Pulmonary:      Effort: Pulmonary effort is normal.      Breath sounds: Normal breath sounds.   Abdominal:      General: There is no distension.      Palpations: Abdomen is soft. There is no mass.      Tenderness: There is no abdominal tenderness. There is no guarding.   Musculoskeletal:         General: No deformity.      Cervical back: Normal range of motion and neck supple.      Right lower leg: No edema.      Left lower leg: No edema.   Lymphadenopathy:      Cervical: No cervical adenopathy.      Upper Body:      Right upper body: No axillary or " epitrochlear adenopathy.      Left upper body: No axillary or epitrochlear adenopathy.   Skin:     General: Skin is warm and dry.   Neurological:      General: No focal deficit present.      Mental Status: She is alert and oriented to person, place, and time.      Cranial Nerves: No cranial nerve deficit.      Motor: No weakness.      Gait: Gait normal.      Deep Tendon Reflexes: Reflexes normal.   Psychiatric:         Mood and Affect: Mood normal.         Behavior: Behavior normal. Behavior is cooperative.         Thought Content: Thought content normal.         Judgment: Judgment normal.         Recent Results (from the past 720 hour(s))   Basic metabolic panel    Collection Time: 11/06/23  1:27 PM   Result Value Ref Range    Sodium 141 135 - 145 mmol/L    Potassium 4.0 3.4 - 5.3 mmol/L    Chloride 105 98 - 107 mmol/L    Carbon Dioxide (CO2) 28 22 - 29 mmol/L    Anion Gap 8 7 - 15 mmol/L    Urea Nitrogen 9.4 6.0 - 20.0 mg/dL    Creatinine 0.90 0.51 - 0.95 mg/dL    GFR Estimate 78 >60 mL/min/1.73m2    Calcium 8.9 8.6 - 10.0 mg/dL    Glucose 107 (H) 70 - 99 mg/dL   Extra Blue Top Tube    Collection Time: 11/06/23  1:27 PM   Result Value Ref Range    Hold Specimen JI    Extra Red Top Tube    Collection Time: 11/06/23  1:27 PM   Result Value Ref Range    Hold Specimen [facility]    Adult Type and Screen    Collection Time: 11/06/23  1:27 PM   Result Value Ref Range    ABO/RH(D) A POS     Antibody Screen Negative Negative    SPECIMEN EXPIRATION DATE [id number]    CBC with platelets and differential    Collection Time: 11/06/23  1:27 PM   Result Value Ref Range    WBC Count 7.8 4.0 - 11.0 10e3/uL    RBC Count 4.34 3.80 - 5.20 10e6/uL    Hemoglobin 12.2 11.7 - 15.7 g/dL    Hematocrit 38.0 35.0 - 47.0 %    MCV 88 78 - 100 fL    MCH 28.1 26.5 - 33.0 pg    MCHC 32.1 31.5 - 36.5 g/dL    RDW 13.8 10.0 - 15.0 %    Platelet Count 301 150 - 450 10e3/uL    % Neutrophils 69 %    % Lymphocytes 25 %    % Monocytes 4 %    % Eosinophils  1 %    % Basophils 1 %    % Immature Granulocytes 0 %    NRBCs per 100 WBC 0 <1 /100    Absolute Neutrophils 5.4 1.6 - 8.3 10e3/uL    Absolute Lymphocytes 1.9 0.8 - 5.3 10e3/uL    Absolute Monocytes 0.3 0.0 - 1.3 10e3/uL    Absolute Eosinophils 0.1 0.0 - 0.7 10e3/uL    Absolute Basophils 0.1 0.0 - 0.2 10e3/uL    Absolute Immature Granulocytes 0.0 <=0.4 10e3/uL    Absolute NRBCs 0.0 10e3/uL     Recent Results (from the past 744 hour(s))   XR Chest 2 Views    Narrative    EXAM: XR CHEST 2 VIEWS  LOCATION: [facility]  DATE: 10/14/2023    INDICATION: Pain.  COMPARISON: None.    IMPRESSION: Mild bibasilar atelectasis. No pleural effusion. The cardiac silhouette and pulmonary vasculature are normal.   CTA Chest with Contrast    Narrative    EXAM: CT ANGIO CHEST W IV CONT PE STUDY  LOCATION: [facility]  DATE: 10/14/2023    INDICATION: Chest pain.  COMPARISON: None.  TECHNIQUE: CT chest pulmonary angiogram during arterial phase injection of IV contrast. Multiplanar reformats and MIP reconstructions were performed. Dose reduction techniques were used.   CONTRAST: IOHEXOL 350 MG/ML IV SOLN 75 mL    FINDINGS:  ANGIOGRAM CHEST: There are a few small segmental and subsegmental emboli within the lung bases and lingula. A few small emboli also seen in the right upper and middle lobes. No evidence for right heart strain. Thoracic aorta negative for dissection or aneurysm.    LUNGS AND PLEURA: Mild subpleural infiltrate left lower lobe and lingula.    MEDIASTINUM/AXILLAE: Normal.    CORONARY ARTERY CALCIFICATION: None.    UPPER ABDOMEN: Fatty liver.    MUSCULOSKELETAL: Normal.    IMPRESSION:  1.  Few small segmental and subsegmental pulmonary emboli in periphery of the lower lobes and within the lingula. Also a few small emboli in the right upper and middle lobes. No evidence for right heart strain.    2.  Mild infiltrate lingula and left lower lobe.    3.  Fatty liver.    Results given to Dr. Abilio CHAHAL Pelvic  Complete with Transvaginal    Narrative    EXAM: US PELVIC TRANSABDOMINAL AND TRANSVAGINAL  LOCATION: [facility]  DATE: 11/6/2023    INDICATION: vaginal bleeding tampon hr with clots, on xarelto  COMPARISON: 4/18/2006  TECHNIQUE: Transabdominal scans were performed. Endovaginal ultrasound was performed to better visualize the adnexa.    FINDINGS:    UTERUS: 10.3 x 6.7 x 4.8 cm. Subserosal fibroid in the left side of the uterus measuring 2.5 x 2.4 x 3.1 cm. No submucosal component. Simple nabothian cysts.    ENDOMETRIUM: 19 mm. Diffusely thickened with no areas of internal vascularity. Echogenic material in the endometrial canal.    RIGHT OVARY: 1.9 x 1.7 x 2.0 cm. Normal.    LEFT OVARY: 2.9 x 1.9 x 1.8 cm. Normal.    No significant free fluid.      Impression    IMPRESSION:  1.  Diffusely thickened endometrium with blood products visualized in the endometrial canal. No focal thickening to suggest polyp.    2.  Small subserosal uterine fibroid.    3.  Normal ovaries.

## 2023-12-05 ENCOUNTER — PATIENT OUTREACH (OUTPATIENT)
Dept: FAMILY MEDICINE | Facility: CLINIC | Age: 48
End: 2023-12-05
Payer: COMMERCIAL

## 2023-12-05 NOTE — LETTER
February 6, 2024      Carey Dawson  [address]  [place] [address]        Dear Carey,     We attempted to reach you via phone and MyChart and received no response. We wanted to inform you that you are overdue to repeat your pap smear. Please reach out to us at [phone].           Sincerely,        Dr. Serrano

## 2024-01-03 PROBLEM — R87.810 CERVICAL HIGH RISK HPV (HUMAN PAPILLOMAVIRUS) TEST POSITIVE: Status: ACTIVE | Noted: 2022-12-19

## 2024-01-03 NOTE — TELEPHONE ENCOUNTER
Patient due for Pap and HPV.    Reminder done today via telephone call-spoke to the pt she said that she has the phone number to call and make an appt.

## 2024-01-26 NOTE — TELEPHONE ENCOUNTER
R[place],   Patient is lost to pap tracking follow-up. Attempts to contact pt have been made per reminder process and there has been no reply and/or no appt scheduled.     Pap Hx:  2008, 2010, 2012, 2016 NIL pap  1/20/20 [facility] pap, Neg HPV  12/19/22 NIL pap, + HR HPV 18. Plan: colp by 3/19/23  12/26/22 Pt notified by phone  02/20/23 [place]--canceled  03/29/23 [place] no bx's taken, visually normal Plan cotest due 12/19/23 12/5/23 Reminder Mychart  01/3/24 Reminder call-spoke to the pt she said that she has the phone number to call and make an appt.  01/26/24 Lost to follow-up for pap tracking, heike routed to provider

## 2024-02-06 NOTE — TELEPHONE ENCOUNTER
Left message for patient to call back. Please informed patient she is due for a pap and assist in scheduling.     Multiple attempts sending letter via mail.

## 2024-02-20 ENCOUNTER — ANCILLARY PROCEDURE (OUTPATIENT)
Dept: MAMMOGRAPHY | Facility: CLINIC | Age: 49
End: 2024-02-20
Attending: FAMILY MEDICINE
Payer: COMMERCIAL

## 2024-02-20 DIAGNOSIS — Z12.31 VISIT FOR SCREENING MAMMOGRAM: ICD-10-CM

## 2024-02-20 PROCEDURE — 77067 SCR MAMMO BI INCL CAD: CPT

## 2024-03-02 ENCOUNTER — HEALTH MAINTENANCE LETTER (OUTPATIENT)
Age: 49
End: 2024-03-02

## 2024-05-20 ASSESSMENT — ASTHMA QUESTIONNAIRES
QUESTION_2 LAST FOUR WEEKS HOW OFTEN HAVE YOU HAD SHORTNESS OF BREATH: NOT AT ALL
ACT_TOTALSCORE: 24
QUESTION_5 LAST FOUR WEEKS HOW WOULD YOU RATE YOUR ASTHMA CONTROL: COMPLETELY CONTROLLED
QUESTION_4 LAST FOUR WEEKS HOW OFTEN HAVE YOU USED YOUR RESCUE INHALER OR NEBULIZER MEDICATION (SUCH AS ALBUTEROL): ONCE A WEEK OR LESS
QUESTION_1 LAST FOUR WEEKS HOW MUCH OF THE TIME DID YOUR ASTHMA KEEP YOU FROM GETTING AS MUCH DONE AT WORK, SCHOOL OR AT HOME: NONE OF THE TIME
QUESTION_3 LAST FOUR WEEKS HOW OFTEN DID YOUR ASTHMA SYMPTOMS (WHEEZING, COUGHING, SHORTNESS OF BREATH, CHEST TIGHTNESS OR PAIN) WAKE YOU UP AT NIGHT OR EARLIER THAN USUAL IN THE MORNING: NOT AT ALL
ACT_TOTALSCORE: 24

## 2024-05-21 ENCOUNTER — OFFICE VISIT (OUTPATIENT)
Dept: FAMILY MEDICINE | Facility: CLINIC | Age: 49
End: 2024-05-21
Payer: COMMERCIAL

## 2024-05-21 VITALS
BODY MASS INDEX: 30.58 KG/M2 | OXYGEN SATURATION: 99 % | HEIGHT: 64 IN | HEART RATE: 70 BPM | RESPIRATION RATE: 20 BRPM | DIASTOLIC BLOOD PRESSURE: 78 MMHG | WEIGHT: 179.1 LBS | SYSTOLIC BLOOD PRESSURE: 134 MMHG | TEMPERATURE: 99.1 F

## 2024-05-21 DIAGNOSIS — Z00.00 ROUTINE GENERAL MEDICAL EXAMINATION AT A HEALTH CARE FACILITY: Primary | ICD-10-CM

## 2024-05-21 DIAGNOSIS — Z12.4 CERVICAL CANCER SCREENING: ICD-10-CM

## 2024-05-21 DIAGNOSIS — Z13.220 SCREENING FOR HYPERLIPIDEMIA: ICD-10-CM

## 2024-05-21 DIAGNOSIS — Z11.59 NEED FOR HEPATITIS C SCREENING TEST: ICD-10-CM

## 2024-05-21 DIAGNOSIS — Z11.4 SCREENING FOR HIV (HUMAN IMMUNODEFICIENCY VIRUS): ICD-10-CM

## 2024-05-21 DIAGNOSIS — Z23 NEED FOR SHINGLES VACCINE: ICD-10-CM

## 2024-05-21 PROBLEM — I26.99 PULMONARY EMBOLISM (H): Status: RESOLVED | Noted: 2023-10-14 | Resolved: 2024-05-21

## 2024-05-21 LAB
ANION GAP SERPL CALCULATED.3IONS-SCNC: 9 MMOL/L (ref 7–15)
BUN SERPL-MCNC: 10.4 MG/DL (ref 6–20)
CALCIUM SERPL-MCNC: 9.5 MG/DL (ref 8.6–10)
CHLORIDE SERPL-SCNC: 106 MMOL/L (ref 98–107)
CHOLEST SERPL-MCNC: 204 MG/DL
CREAT SERPL-MCNC: 0.93 MG/DL (ref 0.51–0.95)
DEPRECATED HCO3 PLAS-SCNC: 23 MMOL/L (ref 22–29)
EGFRCR SERPLBLD CKD-EPI 2021: 74 ML/MIN/1.73M2
FASTING STATUS PATIENT QL REPORTED: ABNORMAL
FASTING STATUS PATIENT QL REPORTED: NORMAL
GLUCOSE SERPL-MCNC: 99 MG/DL (ref 70–99)
HCV AB SERPL QL IA: NONREACTIVE
HDLC SERPL-MCNC: 52 MG/DL
HIV 1+2 AB+HIV1 P24 AG SERPL QL IA: NONREACTIVE
LDLC SERPL CALC-MCNC: 124 MG/DL
NONHDLC SERPL-MCNC: 152 MG/DL
POTASSIUM SERPL-SCNC: 4.8 MMOL/L (ref 3.4–5.3)
SODIUM SERPL-SCNC: 138 MMOL/L (ref 135–145)
TRIGL SERPL-MCNC: 139 MG/DL

## 2024-05-21 PROCEDURE — 87389 HIV-1 AG W/HIV-1&-2 AB AG IA: CPT | Performed by: FAMILY MEDICINE

## 2024-05-21 PROCEDURE — G0145 SCR C/V CYTO,THINLAYER,RESCR: HCPCS | Performed by: FAMILY MEDICINE

## 2024-05-21 PROCEDURE — 99396 PREV VISIT EST AGE 40-64: CPT | Performed by: FAMILY MEDICINE

## 2024-05-21 PROCEDURE — 87624 HPV HI-RISK TYP POOLED RSLT: CPT | Performed by: FAMILY MEDICINE

## 2024-05-21 PROCEDURE — 80048 BASIC METABOLIC PNL TOTAL CA: CPT | Performed by: FAMILY MEDICINE

## 2024-05-21 PROCEDURE — 80061 LIPID PANEL: CPT | Performed by: FAMILY MEDICINE

## 2024-05-21 PROCEDURE — 36415 COLL VENOUS BLD VENIPUNCTURE: CPT | Performed by: FAMILY MEDICINE

## 2024-05-21 PROCEDURE — 86803 HEPATITIS C AB TEST: CPT | Performed by: FAMILY MEDICINE

## 2024-05-21 RX ORDER — ZOSTER VACCINE RECOMBINANT, ADJUVANTED 50 MCG/0.5
1 KIT INTRAMUSCULAR ONCE
Qty: 0.5 ML | Refills: 0 | Status: SHIPPED | OUTPATIENT
Start: 2024-05-21 | End: 2024-05-21

## 2024-05-21 ASSESSMENT — PAIN SCALES - GENERAL: PAINLEVEL: NO PAIN (0)

## 2024-05-21 NOTE — PATIENT INSTRUCTIONS
"Preventive Care Advice   This is general advice we often give to help people stay healthy. Your care team may have specific advice just for you. Please talk to your care team about your own preventive care needs.  Lifestyle  Exercise at least 150 minutes each week (30 minutes a day, 5 days a week).  Do muscle strengthening activities 2 days a week. These help control your weight and prevent disease.  No smoking.  Wear sunscreen to prevent skin cancer.  Have your home tested for radon every 2 to 5 years. Radon is a colorless, odorless gas that can harm your lungs. To learn more, go to www.health.[facility].[place]. and search for \"Radon in Homes.\"  Keep guns unloaded and locked up in a safe place like a safe or gun vault, or, use a gun lock and hide the keys. Always lock away bullets separately. To learn more, visit [employer].[place].gov and search for \"safe gun storage.\"  Nutrition  Eat 5 or more servings of fruits and vegetables each day.  Try wheat bread, brown rice and whole grain pasta (instead of white bread, rice, and pasta).  Get enough calcium and vitamin D. Check the label on foods and aim for 100% of the RDA (recommended daily allowance).  Regular exams  Have a dental exam and cleaning every 6 months.  See your health care team every year to talk about:  Any changes in your health.  Any medicines your care team has prescribed.  Preventive care, family planning, and ways to prevent chronic diseases.  Shots (vaccines)   HPV shots (up to age 26), if you've never had them before.  Hepatitis B shots (up to age 59), if you've never had them before.  COVID-19 shot: Get this shot when it's due.  Flu shot: Get a flu shot every year.  Tetanus shot: Get a tetanus shot every 10 years.  Pneumococcal, hepatitis A, and RSV shots: Ask your care team if you need these based on your risk.  Shingles shot (for age 50 and up).  General health tests  Diabetes screening:  Starting at age 35, Get screened for diabetes at least every 3 years.  If " you are younger than age 35, ask your care team if you should be screened for diabetes.  Cholesterol test: At age 39, start having a cholesterol test every 5 years, or more often if advised.  Bone density scan (DEXA): At age 50, ask your care team if you should have this scan for osteoporosis (brittle bones).  Hepatitis C: Get tested at least once in your life.  Abdominal aortic aneurysm screening: Talk to your doctor about having this screening if you:  Have ever smoked; and  Are biologically male; and  Are between the ages of 65 and 75.  STIs (sexually transmitted infections)  Before age 24: Ask your care team if you should be screened for STIs.  After age 24: Get screened for STIs if you're at risk. You are at risk for STIs (including HIV) if:  You are sexually active with more than one person.  You don't use condoms every time.  You or a partner was diagnosed with a sexually transmitted infection.  If you are at risk for HIV, ask about PrEP medicine to prevent HIV.  Get tested for HIV at least once in your life, whether you are at risk for HIV or not.  Cancer screening tests  Cervical cancer screening: If you have a cervix, begin getting regular cervical cancer screening tests at age 21. Most people who have regular screenings with normal results can stop after age 65. Talk about this with your provider.  Breast cancer scan (mammogram): If you've ever had breasts, begin having regular mammograms starting at age 40. This is a scan to check for breast cancer.  Colon cancer screening: It is important to start screening for colon cancer at age 45.  Have a colonoscopy test every 10 years (or more often if you're at risk) Or, ask your provider about stool tests like a FIT test every year or Cologuard test every 3 years.  To learn more about your testing options, visit: www.[employer]861392.pdf.  For help making a decision, visit: jeremy/ki04657.  Prostate cancer screening test: If you have a prostate and are age 55  to 69, ask your provider if you would benefit from a yearly prostate cancer screening test.  Lung cancer screening: If you are a current or former smoker age 50 to 80, ask your care team if ongoing lung cancer screenings are right for you.  For informational purposes only. Not to replace the advice of your health care provider. Copyright   2023 [employer]. All rights reserved. Clinically reviewed by the [facility] Transitions Program. [employer] [id number] - REV 04/24.

## 2024-05-23 LAB
HPV HR 12 DNA CVX QL NAA+PROBE: NEGATIVE
HPV16 DNA CVX QL NAA+PROBE: NEGATIVE
HPV18 DNA CVX QL NAA+PROBE: NEGATIVE
HUMAN PAPILLOMA VIRUS FINAL DIAGNOSIS: NORMAL

## 2024-05-29 LAB
BKR LAB AP GYN ADEQUACY: NORMAL
BKR LAB AP GYN INTERPRETATION: NORMAL
BKR LAB AP LMP: NORMAL
BKR LAB AP PREVIOUS ABNL DX: NORMAL
BKR LAB AP PREVIOUS ABNORMAL: NORMAL
PATH REPORT.COMMENTS IMP SPEC: NORMAL
PATH REPORT.COMMENTS IMP SPEC: NORMAL
PATH REPORT.RELEVANT HX SPEC: NORMAL

## 2025-02-27 ENCOUNTER — ANCILLARY PROCEDURE (OUTPATIENT)
Dept: MAMMOGRAPHY | Facility: HOSPITAL | Age: 50
End: 2025-02-27
Attending: FAMILY MEDICINE
Payer: COMMERCIAL

## 2025-02-27 DIAGNOSIS — Z12.31 VISIT FOR SCREENING MAMMOGRAM: ICD-10-CM

## 2025-02-27 PROCEDURE — 77067 SCR MAMMO BI INCL CAD: CPT

## 2025-02-27 PROCEDURE — 77063 BREAST TOMOSYNTHESIS BI: CPT

## 2025-04-01 ENCOUNTER — OFFICE VISIT (OUTPATIENT)
Dept: URGENT CARE | Facility: URGENT CARE | Age: 50
End: 2025-04-01
Payer: COMMERCIAL

## 2025-04-01 VITALS
HEART RATE: 70 BPM | WEIGHT: 178 LBS | SYSTOLIC BLOOD PRESSURE: 165 MMHG | OXYGEN SATURATION: 100 % | TEMPERATURE: 97.7 F | HEIGHT: 64 IN | BODY MASS INDEX: 30.39 KG/M2 | RESPIRATION RATE: 19 BRPM | DIASTOLIC BLOOD PRESSURE: 100 MMHG

## 2025-04-01 DIAGNOSIS — I10 BENIGN ESSENTIAL HYPERTENSION: Primary | ICD-10-CM

## 2025-04-01 PROCEDURE — 3080F DIAST BP >= 90 MM HG: CPT | Performed by: FAMILY MEDICINE

## 2025-04-01 PROCEDURE — 99213 OFFICE O/P EST LOW 20 MIN: CPT | Performed by: FAMILY MEDICINE

## 2025-04-01 PROCEDURE — 3077F SYST BP >= 140 MM HG: CPT | Performed by: FAMILY MEDICINE

## 2025-04-01 RX ORDER — LISINOPRIL 5 MG/1
5 TABLET ORAL DAILY
Qty: 30 TABLET | Refills: 11 | Status: SHIPPED | OUTPATIENT
Start: 2025-04-01

## 2025-04-01 NOTE — PROGRESS NOTES
"OUTPATIENT VISIT NOTE                                                   Date of Visit: 4/1/2025     Chief Complaint   Patient presents with:  Hypertension: Takes BP at home and avg 177/110. PCP can't get her in until June. No sx. When she gets stressed, her veins feel like they are about to pop out.             History of Present Illness   Carey Dawson is a 50 year old female has been checking BP at home and has been running 170/110.  No history of hypertension.  No chest pain.  No shortness of breath.  No visual problems.  No speech problems, no weakness.  History of hypertension in family.    Walks 1 hour three times a week.  Alcohol 1 beer a day.  Salt :  Does own food prep         MEDICATIONS   Current Outpatient Medications   Medication Sig Dispense Refill    albuterol (PROAIR HFA/PROVENTIL HFA/VENTOLIN HFA) 108 (90 Base) MCG/ACT inhaler Inhale 2 puffs into the lungs every 6 hours as needed for shortness of breath, wheezing or cough 18 g 1    lisinopril (ZESTRIL) 5 MG tablet Take 1 tablet (5 mg) by mouth daily. 30 tablet 11     No current facility-administered medications for this visit.         SOCIAL HISTORY   Social History     Tobacco Use    Smoking status: Never    Smokeless tobacco: Never   Substance Use Topics    Alcohol use: Yes     Alcohol/week: 7.0 standard drinks of alcohol     Types: 7 Standard drinks or equivalent per week           Physical Exam   Vitals:    04/01/25 1016   BP: (!) 165/100   BP Location: Right arm   Patient Position: Sitting   Cuff Size: Adult Regular   Pulse: 70   Resp: 19   Temp: 97.7  F (36.5  C)   TempSrc: Oral   SpO2: 100%   Weight: 80.7 kg (178 lb)   Height: 1.618 m (5' 3.7\")        GEN:  NAD  LUNGS:  Clear to auscultation without wheezing.  Normal effort.  HEART:  RRR without murmur, rub or gallop .  No carotid bruits  LEGS: no Edema         Assessment and Plan     Benign essential hypertension  Reviewed BP readings in chart back to 2023.    All have been significantly " elevated except one.  Discussed she likely needs treatment as they have been consistently elevated and she has good lifestyle habits.  Will start lisinopril.  Follow up with her MD within a month  - lisinopril (ZESTRIL) 5 MG tablet  Dispense: 30 tablet; Refill: 11                 Discussed signs / symptoms that warrant urgent / emergent medical attention.   Recheck if worsening or not improving.       Randi Garnett MD          Pertinent History     The following portions of the patient's history were reviewed and updated as appropriate: allergies, current medications, past family history, past medical history, past social history, past surgical history and problem list.

## 2025-04-21 ENCOUNTER — PATIENT OUTREACH (OUTPATIENT)
Dept: CARE COORDINATION | Facility: CLINIC | Age: 50
End: 2025-04-21
Payer: COMMERCIAL

## 2025-05-29 ENCOUNTER — OFFICE VISIT (OUTPATIENT)
Dept: INTERNAL MEDICINE | Facility: CLINIC | Age: 50
End: 2025-05-29
Payer: COMMERCIAL

## 2025-05-29 ENCOUNTER — MYC MEDICAL ADVICE (OUTPATIENT)
Dept: INTERNAL MEDICINE | Facility: CLINIC | Age: 50
End: 2025-05-29

## 2025-05-29 VITALS
WEIGHT: 175.1 LBS | HEART RATE: 68 BPM | DIASTOLIC BLOOD PRESSURE: 80 MMHG | RESPIRATION RATE: 16 BRPM | BODY MASS INDEX: 31.02 KG/M2 | HEIGHT: 63 IN | TEMPERATURE: 98.8 F | SYSTOLIC BLOOD PRESSURE: 126 MMHG

## 2025-05-29 DIAGNOSIS — Z12.83 SKIN CANCER SCREENING: ICD-10-CM

## 2025-05-29 DIAGNOSIS — L03.115 CELLULITIS OF RIGHT LOWER EXTREMITY: ICD-10-CM

## 2025-05-29 DIAGNOSIS — Z00.00 ROUTINE GENERAL MEDICAL EXAMINATION AT A HEALTH CARE FACILITY: Primary | ICD-10-CM

## 2025-05-29 DIAGNOSIS — I10 PRIMARY HYPERTENSION: ICD-10-CM

## 2025-05-29 DIAGNOSIS — I10 BENIGN ESSENTIAL HYPERTENSION: ICD-10-CM

## 2025-05-29 DIAGNOSIS — Z13.220 LIPID SCREENING: ICD-10-CM

## 2025-05-29 DIAGNOSIS — J45.990 EXERCISE INDUCED BRONCHOSPASM: ICD-10-CM

## 2025-05-29 RX ORDER — DOXYCYCLINE 100 MG/1
100 CAPSULE ORAL 2 TIMES DAILY
Qty: 20 CAPSULE | Refills: 0 | Status: SHIPPED | OUTPATIENT
Start: 2025-05-29 | End: 2025-06-08

## 2025-05-29 SDOH — HEALTH STABILITY: PHYSICAL HEALTH: ON AVERAGE, HOW MANY DAYS PER WEEK DO YOU ENGAGE IN MODERATE TO STRENUOUS EXERCISE (LIKE A BRISK WALK)?: 3 DAYS

## 2025-05-29 SDOH — HEALTH STABILITY: PHYSICAL HEALTH: ON AVERAGE, HOW MANY MINUTES DO YOU ENGAGE IN EXERCISE AT THIS LEVEL?: 60 MIN

## 2025-05-29 ASSESSMENT — SOCIAL DETERMINANTS OF HEALTH (SDOH): HOW OFTEN DO YOU GET TOGETHER WITH FRIENDS OR RELATIVES?: ONCE A WEEK

## 2025-05-29 ASSESSMENT — ASTHMA QUESTIONNAIRES
ACT_TOTALSCORE: 24
QUESTION_3 LAST FOUR WEEKS HOW OFTEN DID YOUR ASTHMA SYMPTOMS (WHEEZING, COUGHING, SHORTNESS OF BREATH, CHEST TIGHTNESS OR PAIN) WAKE YOU UP AT NIGHT OR EARLIER THAN USUAL IN THE MORNING: NOT AT ALL
QUESTION_5 LAST FOUR WEEKS HOW WOULD YOU RATE YOUR ASTHMA CONTROL: COMPLETELY CONTROLLED
QUESTION_4 LAST FOUR WEEKS HOW OFTEN HAVE YOU USED YOUR RESCUE INHALER OR NEBULIZER MEDICATION (SUCH AS ALBUTEROL): ONCE A WEEK OR LESS
QUESTION_2 LAST FOUR WEEKS HOW OFTEN HAVE YOU HAD SHORTNESS OF BREATH: NOT AT ALL
QUESTION_1 LAST FOUR WEEKS HOW MUCH OF THE TIME DID YOUR ASTHMA KEEP YOU FROM GETTING AS MUCH DONE AT WORK, SCHOOL OR AT HOME: NONE OF THE TIME

## 2025-05-29 NOTE — PROGRESS NOTES
Internal Medicine Preventive Care Visit  [address]  [place] [address]  Phone: [phone]  Fax: [phone]          Patient Name: Carey Dawson  Patient Age: 50 year old  YOB: 1975  MRN: [id number]    Date of Visit: 5/29/2025  Primary Provider: Catarina Casillas   Patient presents with:  Physical:           5/29/2025     1:17 PM   Additional Questions   Roomed by Adrienne Mcdonald   Accompanied by N/A     HPI:  Carey Dawson, 50-year-old female    - Exercise-induced asthma, uses albuterol inhaler  - Pap smear in 2024, recommended every 3 years due to prior history of abnormals  - Mole noticed a few years ago, large, does not itch or bleed, smooth borders  - Recent bite noticed 1-2 days ago, initially thought to be a mosquito bite, does not itch, tender when pushed. It does have a bullseye appearance and she was recently in the woods.   - No tick seen on leg.   - Blood pressure previously high, reported as 190/75-80 during a period of stress due to daughter's wedding. Doing well with lisinopril.           5/29/2025   Social Factors   Frequency of gathering with friends or relatives Once a week   Worry food won't last until get money to buy more No   Food not last or not have enough money for food? No   Do you have housing? (Housing is defined as stable permanent housing and does not include staying outside in a car, in a tent, in an abandoned building, in an overnight shelter, or couch-surfing.) Yes   Are you worried about losing your housing? No   Lack of transportation? No   Unable to get utilities (heat,electricity)? No           5/29/2025   Fall Risk   Fallen 2 or more times in the past year? No   Trouble with walking or balance? No            5/29/2025   Dental   Dentist two times every year? Yes       Today's PHQ-2 Score:       5/29/2025     6:06 AM   PHQ-2 ( 1999 Pfizer)   Q1: Little interest or pleasure in doing things  0   Q2: Feeling down, depressed or hopeless 0   PHQ-2 Score 0    Q1: Little interest or pleasure in doing things Not at all   Q2: Feeling down, depressed or hopeless Not at all   PHQ-2 Score 0       Patient-reported           5/29/2025   Substance Use   Alcohol more than 3/day or more than 7/wk No   Do you use any other substances recreationally? No     Social History     Tobacco Use    Smoking status: Never    Smokeless tobacco: Never   Substance Use Topics    Alcohol use: Yes     Alcohol/week: 7.0 standard drinks of alcohol     Types: 7 Standard drinks or equivalent per week    Drug use: Never               2/27/2025   LAST FHS-7 RESULTS   1st degree relative breast or ovarian cancer No   Any relative bilateral breast cancer No   Any male have breast cancer No   Any ONE woman have BOTH breast AND ovarian cancer No   Any woman with breast cancer before 50yrs No   2 or more relatives with breast AND/OR ovarian cancer No   2 or more relatives with breast AND/OR bowel cancer No           5/29/2025   STI Screening   New sexual partner(s) since last STI/HIV test? No               Latest Ref Rng & Units 5/21/2024     9:48 AM 12/19/2022     3:48 PM   PAP / HPV   PAP  Negative for Intraepithelial Lesion or Malignancy (NILM)  Negative for Intraepithelial Lesion or Malignancy (NILM)    HPV 16 DNA Negative Negative  Negative    HPV 18 DNA Negative Negative  Positive    Other HR HPV Negative Negative  Negative      ASCVD Risk   The 10-year ASCVD risk score (Rubin DOUGHERTY, et al., 2019) is: 2.1%    Values used to calculate the score:      Age: 50 years      Sex: Female      Is Non- : No      Diabetic: No      Tobacco smoker: No      Systolic Blood Pressure: 126 mmHg      Is BP treated: Yes      HDL Cholesterol: 52 mg/dL      Total Cholesterol: 204 mg/dL      Patient Active Problem List   Diagnosis    Exercise induced bronchospasm    Cervical high risk HPV (human papillomavirus) test positive     "Primary hypertension     Current Scheduled Meds:  Current Outpatient Medications   Medication Sig Dispense Refill    doxycycline monohydrate (MONODOX) 100 MG capsule Take 1 capsule (100 mg) by mouth 2 times daily for 10 days. 20 capsule 0    albuterol (PROAIR HFA/PROVENTIL HFA/VENTOLIN HFA) 108 (90 Base) MCG/ACT inhaler Inhale 2 puffs into the lungs every 6 hours as needed for shortness of breath, wheezing or cough. 18 g 5    lisinopril (ZESTRIL) 5 MG tablet Take 1 tablet (5 mg) by mouth daily. 30 tablet 11         Objective   Physical Examination:  Vitals:    05/29/25 1318   BP: 126/80   Pulse: 68   Resp: 16   Temp: 98.8  F (37.1  C)   TempSrc: Oral   Weight: 79.4 kg (175 lb 1.6 oz)   Height: 1.6 m (5' 2.99\")     Wt Readings from Last 5 Encounters:   05/29/25 79.4 kg (175 lb 1.6 oz)   05/02/25 80.5 kg (177 lb 6.4 oz)   04/01/25 80.7 kg (178 lb)   05/21/24 81.2 kg (179 lb 1.6 oz)   11/08/23 81.6 kg (180 lb)     Body mass index is 31.03 kg/m .     Constitutional: In no apparent distress  Eyes: Conjunctivae clear. Non-icteric.   ENT: Tympanic membrane clear with landmarks well visualized bilaterally. Lips and mucosa moist. Pharynx without erythema or exudate.   Respiratory: Clear to auscultation bilaterally. Normal inspiratory and expiratory effort  Cardiovascular: Regular rate and rhythm. No murmurs, rubs, or gallops. No edema.  Gastrointestinal: Bowel sounds active all four quadrants. Soft, non-tender.   Skin: right upper inner thigh erythematous papule with surrounding erythema   Psych: Alert and oriented x3.     Diagnoses managed today:  1. Routine general medical examination at a health care facility    2. Benign essential hypertension    3. Exercise induced bronchospasm    4. Lipid screening    5. Cellulitis of right lower extremity    6. Skin cancer screening    7. Primary hypertension         Assessment & Plan     Annual physical  - Routine general medical examination conducted. Reviewed recommended preventive " care including vaccines. She declines hep B, COVID, pneumonia, and shingles vaccine. May consider the latter but would like to check on insurance coverage.     Benign essential hypertension  - Blood pressure is well-controlled at 126/80 mmHg.  - Continue current medication regimen. No change to 90-day supply of lisinopril per pt.    Exercise induced bronchospasm  - Managed with albuterol inhaler.  - No refill needed for albuterol inhaler.    Lipid screening  - Cholesterol levels are borderline, not requiring medication at this time.  - Monitor cholesterol levels. Perform electrolyte panel and cholesterol test.    Cellulitis of right lower extremity  - Likely cellulitis due to insect bite, differential includes Lyme disease.  - Prescribe doxycycline 100 mg twice daily for 10 days. Monitor lesion size; if it continues to expand after 24-48 hours, return for re-evaluation. Avoid sun exposure and high calcium foods during treatment.  - Risks and side effects: Potential for stomach upset; advised to take with food. Increased sensitivity to sun exposure.        I am having Carey Dawson start on doxycycline monohydrate. I am also having her maintain her lisinopril and albuterol.        Orders Placed This Encounter   Procedures    BASIC METABOLIC PANEL    Lipid panel reflex to direct LDL Fasting    Adult Dermatology  Referral       Follow up: Return in about 1 year (around 5/29/2026) for physical . Sooner if needed.    The longitudinal plan of care for the diagnosis(es)/condition(s) as documented were addressed during this visit. Due to the added complexity in care, I will continue to support Carey in the subsequent management and with ongoing continuity of care.    Cher Marte, DNP, APRN, CNP   Electronically signed

## 2025-05-29 NOTE — PROGRESS NOTES
Preventive Care Visit  [facility]  Cher Marte NP, Internal Medicine  May 29, 2025  {Provider  Link to [facility] :790551}    {PROVIDER CHARTING PREFERENCE:555891}    Omi Patino is a 50 year old, presenting for the following:  Physical ( )        5/29/2025     1:17 PM   Additional Questions   Roomed by Adrienne Na   Accompanied by N/A          HPI  ***   {MA/LPN/RN Pre-Provider Visit Orders- hCG/UA/Strep (Optional):586680}  {SUPERLIST (Optional):295438}  {additonal problems for provider to add (Optional):484785}  Advance Care Planning  {The storyboard will display whether the patient has ACP docs on file. Hover over the Code section in the storyboard to access the ACP documents. :769744}  {(AWV REQUIRED) Advance Care Planning Reviewed:097453}        5/29/2025   General Health   How would you rate your overall physical health? Good   Feel stress (tense, anxious, or unable to sleep) Not at all         5/29/2025   Nutrition   Three or more servings of calcium each day? (!) I DON'T KNOW   Diet: Regular (no restrictions)   How many servings of fruit and vegetables per day? (!) 2-3   How many sweetened beverages each day? 0-1         5/29/2025   Exercise   Days per week of moderate/strenous exercise 3 days   Average minutes spent exercising at this level 60 min         5/29/2025   Social Factors   Frequency of gathering with friends or relatives Once a week   Worry food won't last until get money to buy more No   Food not last or not have enough money for food? No   Do you have housing? (Housing is defined as stable permanent housing and does not include staying outside in a car, in a tent, in an abandoned building, in an overnight shelter, or couch-surfing.) Yes   Are you worried about losing your housing? No   Lack of transportation? No   Unable to get utilities (heat,electricity)? No         5/29/2025   Fall Risk   Fallen 2 or more times in the past year? No   Trouble with walking or balance?  No          5/29/2025   Dental   Dentist two times every year? Yes         Today's PHQ-2 Score:       5/29/2025     6:06 AM   PHQ-2 ( 1999 Pfizer)   Q1: Little interest or pleasure in doing things 0   Q2: Feeling down, depressed or hopeless 0   PHQ-2 Score 0    Q1: Little interest or pleasure in doing things Not at all   Q2: Feeling down, depressed or hopeless Not at all   PHQ-2 Score 0       Patient-reported           5/29/2025   Substance Use   Alcohol more than 3/day or more than 7/wk No   Do you use any other substances recreationally? No     Social History     Tobacco Use    Smoking status: Never    Smokeless tobacco: Never   Substance Use Topics    Alcohol use: Yes     Alcohol/week: 7.0 standard drinks of alcohol     Types: 7 Standard drinks or equivalent per week    Drug use: Never     {Provider  If there are gaps in the social history shown above, please follow the link to update and then refresh the note Link to Social and Substance History :697621}      2/27/2025   LAST FHS-7 RESULTS   1st degree relative breast or ovarian cancer No   Any relative bilateral breast cancer No   Any male have breast cancer No   Any ONE woman have BOTH breast AND ovarian cancer No   Any woman with breast cancer before 50yrs No   2 or more relatives with breast AND/OR ovarian cancer No   2 or more relatives with breast AND/OR bowel cancer No     {If any of the questions to the FHS7 are answered yes, consider referral for genetic counseling.    Additional indications for genetic referral include personal history of breast or ovarian cancer, genetic mutation in 1st degree relative which increases risk of breast cancer including BRCA1, BRCA2, JR, PALB 2, TP53, CHEK2, PTEN, CDH1, STK11 (per ACS) and/or 1st degree relative with history of pancreatic or high-risk prostate cancer (per NCCN):319040}   {Mammogram Decision Support (Optional):323653}        5/29/2025   STI Screening   New sexual partner(s) since last STI/HIV test? No  "    History of abnormal Pap smear: { :486747}        Latest Ref Rng & Units 5/21/2024     9:48 AM 12/19/2022     3:48 PM   PAP / HPV   PAP  Negative for Intraepithelial Lesion or Malignancy (NILM)  Negative for Intraepithelial Lesion or Malignancy (NILM)    HPV 16 DNA Negative Negative  Negative    HPV 18 DNA Negative Negative  Positive    Other HR HPV Negative Negative  Negative      ASCVD Risk   The 10-year ASCVD risk score (Rubin DOUGHERTY, et al., 2019) is: 2.1%    Values used to calculate the score:      Age: 50 years      Sex: Female      Is Non- : No      Diabetic: No      Tobacco smoker: No      Systolic Blood Pressure: 126 mmHg      Is BP treated: Yes      HDL Cholesterol: 52 mg/dL      Total Cholesterol: 204 mg/dL    {Link to Fracture Risk Assessment Tool (Optional):453512}    {Provider  REQUIRED FOR AWV Use the storyboard to review patient history, after sections have been marked as reviewed, refresh note to capture documentation:226272}   Reviewed and updated as needed this visit by Provider                    {HISTORY OPTIONS (Optional):106601}    {ROS Picklists (Optional):394860}     Objective    Exam  /80   Pulse 68   Temp 98.8  F (37.1  C) (Oral)   Resp 16   Ht 1.6 m (5' 2.99\")   Wt 79.4 kg (175 lb 1.6 oz)   LMP 03/16/2025   BMI 31.03 kg/m     Estimated body mass index is 31.03 kg/m  as calculated from the following:    Height as of this encounter: 1.6 m (5' 2.99\").    Weight as of this encounter: 79.4 kg (175 lb 1.6 oz).    Physical Exam  {Exam Choices (Optional):200489}        Signed Electronically by: Cher Marte NP  {Email feedback regarding this note to [email or web address]   :410365}  "

## 2025-05-29 NOTE — PATIENT INSTRUCTIONS
Patient Education   Preventive Care Advice   This is general advice given by our system to help you stay healthy. However, your care team may have specific advice just for you. Please talk to your care team about your preventive care needs.  Nutrition  Eat 5 or more servings of fruits and vegetables each day.  Try wheat bread, brown rice and whole grain pasta (instead of white bread, rice, and pasta).  Get enough calcium and vitamin D. Check the label on foods and aim for 100% of the RDA (recommended daily allowance).  Lifestyle  Exercise at least 150 minutes each week  (30 minutes a day, 5 days a week).  Do muscle strengthening activities 2 days a week. These help control your weight and prevent disease.  No smoking.  Wear sunscreen to prevent skin cancer.  Have a dental exam and cleaning every 6 months.  Yearly exams  See your health care team every year to talk about:  Any changes in your health.  Any medicines your care team has prescribed.  Preventive care, family planning, and ways to prevent chronic diseases.  Shots (vaccines)   HPV shots (up to age 26), if you've never had them before.  Hepatitis B shots (up to age 59), if you've never had them before.  COVID-19 shot: Get this shot when it's due.  Flu shot: Get a flu shot every year.  Tetanus shot: Get a tetanus shot every 10 years.  Pneumococcal, hepatitis A, and RSV shots: Ask your care team if you need these based on your risk.  Shingles shot (for age 50 and up)  General health tests  Diabetes screening:  Starting at age 35, Get screened for diabetes at least every 3 years.  If you are younger than age 35, ask your care team if you should be screened for diabetes.  Cholesterol test: At age 39, start having a cholesterol test every 5 years, or more often if advised.  Bone density scan (DEXA): At age 50, ask your care team if you should have this scan for osteoporosis (brittle bones).  Hepatitis C: Get tested at least once in your life.  STIs (sexually  transmitted infections)  Before age 24: Ask your care team if you should be screened for STIs.  After age 24: Get screened for STIs if you're at risk. You are at risk for STIs (including HIV) if:  You are sexually active with more than one person.  You don't use condoms every time.  You or a partner was diagnosed with a sexually transmitted infection.  If you are at risk for HIV, ask about PrEP medicine to prevent HIV.  Get tested for HIV at least once in your life, whether you are at risk for HIV or not.  Cancer screening tests  Cervical cancer screening: If you have a cervix, begin getting regular cervical cancer screening tests starting at age 21.  Breast cancer scan (mammogram): If you've ever had breasts, begin having regular mammograms starting at age 40. This is a scan to check for breast cancer.  Colon cancer screening: It is important to start screening for colon cancer at age 45.  Have a colonoscopy test every 10 years (or more often if you're at risk) Or, ask your provider about stool tests like a FIT test every year or Cologuard test every 3 years.  To learn more about your testing options, visit:   .  For help making a decision, visit:   https://bit.ly/il50668.  Prostate cancer screening test: If you have a prostate, ask your care team if a prostate cancer screening test (PSA) at age 55 is right for you.  Lung cancer screening: If you are a current or former smoker ages 50 to 80, ask your care team if ongoing lung cancer screenings are right for you.  For informational purposes only. Not to replace the advice of your health care provider. Copyright   2023 [employer]. All rights reserved. Clinically reviewed by the [facility] Transitions Program. [employer] [id number] - REV 01/24.

## 2025-06-02 ENCOUNTER — PATIENT OUTREACH (OUTPATIENT)
Dept: CARE COORDINATION | Facility: CLINIC | Age: 50
End: 2025-06-02
Payer: COMMERCIAL

## 2025-06-02 ENCOUNTER — RESULTS FOLLOW-UP (OUTPATIENT)
Dept: INTERNAL MEDICINE | Facility: CLINIC | Age: 50
End: 2025-06-02